# Patient Record
Sex: FEMALE | Race: WHITE | NOT HISPANIC OR LATINO | URBAN - METROPOLITAN AREA
[De-identification: names, ages, dates, MRNs, and addresses within clinical notes are randomized per-mention and may not be internally consistent; named-entity substitution may affect disease eponyms.]

---

## 2017-09-05 ENCOUNTER — OUTPATIENT (OUTPATIENT)
Dept: OUTPATIENT SERVICES | Facility: HOSPITAL | Age: 57
LOS: 1 days | Discharge: HOME | End: 2017-09-05
Payer: MEDICARE

## 2017-09-05 PROCEDURE — 45378 DIAGNOSTIC COLONOSCOPY: CPT

## 2017-09-07 DIAGNOSIS — K64.4 RESIDUAL HEMORRHOIDAL SKIN TAGS: ICD-10-CM

## 2017-09-07 DIAGNOSIS — Z12.11 ENCOUNTER FOR SCREENING FOR MALIGNANT NEOPLASM OF COLON: ICD-10-CM

## 2017-09-28 ENCOUNTER — INPATIENT (INPATIENT)
Facility: HOSPITAL | Age: 57
LOS: 4 days | Discharge: NURSING, REHAB OR LONG-TERM CARE | DRG: 897 | End: 2017-10-03
Attending: SPECIALIST | Admitting: SPECIALIST
Payer: MEDICARE

## 2017-09-28 VITALS
HEIGHT: 66 IN | OXYGEN SATURATION: 96 % | TEMPERATURE: 98 F | RESPIRATION RATE: 18 BRPM | HEART RATE: 87 BPM | SYSTOLIC BLOOD PRESSURE: 121 MMHG | DIASTOLIC BLOOD PRESSURE: 75 MMHG | WEIGHT: 166.67 LBS

## 2017-09-28 DIAGNOSIS — Z29.9 ENCOUNTER FOR PROPHYLACTIC MEASURES, UNSPECIFIED: ICD-10-CM

## 2017-09-28 DIAGNOSIS — I10 ESSENTIAL (PRIMARY) HYPERTENSION: ICD-10-CM

## 2017-09-28 DIAGNOSIS — R52 PAIN, UNSPECIFIED: ICD-10-CM

## 2017-09-28 DIAGNOSIS — Z98.890 OTHER SPECIFIED POSTPROCEDURAL STATES: Chronic | ICD-10-CM

## 2017-09-28 DIAGNOSIS — F41.9 ANXIETY DISORDER, UNSPECIFIED: ICD-10-CM

## 2017-09-28 DIAGNOSIS — R63.8 OTHER SYMPTOMS AND SIGNS CONCERNING FOOD AND FLUID INTAKE: ICD-10-CM

## 2017-09-28 LAB
ALBUMIN SERPL ELPH-MCNC: 3.7 G/DL — SIGNIFICANT CHANGE UP (ref 3.3–5)
ALP SERPL-CCNC: 89 U/L — SIGNIFICANT CHANGE UP (ref 40–120)
ALT FLD-CCNC: 13 U/L — SIGNIFICANT CHANGE UP (ref 10–45)
ANION GAP SERPL CALC-SCNC: 11 MMOL/L — SIGNIFICANT CHANGE UP (ref 5–17)
APPEARANCE UR: CLEAR — SIGNIFICANT CHANGE UP
APTT BLD: 31.8 SEC — SIGNIFICANT CHANGE UP (ref 27.5–37.4)
AST SERPL-CCNC: 16 U/L — SIGNIFICANT CHANGE UP (ref 10–40)
BASOPHILS NFR BLD AUTO: 0.4 % — SIGNIFICANT CHANGE UP (ref 0–2)
BILIRUB SERPL-MCNC: 0.2 MG/DL — SIGNIFICANT CHANGE UP (ref 0.2–1.2)
BILIRUB UR-MCNC: NEGATIVE — SIGNIFICANT CHANGE UP
BUN SERPL-MCNC: 19 MG/DL — SIGNIFICANT CHANGE UP (ref 7–23)
CALCIUM SERPL-MCNC: 9.1 MG/DL — SIGNIFICANT CHANGE UP (ref 8.4–10.5)
CHLORIDE SERPL-SCNC: 97 MMOL/L — SIGNIFICANT CHANGE UP (ref 96–108)
CO2 SERPL-SCNC: 30 MMOL/L — SIGNIFICANT CHANGE UP (ref 22–31)
COLOR SPEC: YELLOW — SIGNIFICANT CHANGE UP
CREAT SERPL-MCNC: 0.7 MG/DL — SIGNIFICANT CHANGE UP (ref 0.5–1.3)
DIFF PNL FLD: NEGATIVE — SIGNIFICANT CHANGE UP
EOSINOPHIL NFR BLD AUTO: 2.2 % — SIGNIFICANT CHANGE UP (ref 0–6)
GLUCOSE SERPL-MCNC: 73 MG/DL — SIGNIFICANT CHANGE UP (ref 70–99)
GLUCOSE UR QL: NEGATIVE — SIGNIFICANT CHANGE UP
HCT VFR BLD CALC: 37.9 % — SIGNIFICANT CHANGE UP (ref 34.5–45)
HGB BLD-MCNC: 12.3 G/DL — SIGNIFICANT CHANGE UP (ref 11.5–15.5)
INR BLD: 1.13 — SIGNIFICANT CHANGE UP (ref 0.88–1.16)
KETONES UR-MCNC: NEGATIVE — SIGNIFICANT CHANGE UP
LEUKOCYTE ESTERASE UR-ACNC: NEGATIVE — SIGNIFICANT CHANGE UP
LIDOCAIN IGE QN: 27 U/L — SIGNIFICANT CHANGE UP (ref 7–60)
LYMPHOCYTES # BLD AUTO: 26.2 % — SIGNIFICANT CHANGE UP (ref 13–44)
MAGNESIUM SERPL-MCNC: 2.1 MG/DL — SIGNIFICANT CHANGE UP (ref 1.6–2.6)
MCHC RBC-ENTMCNC: 26.1 PG — LOW (ref 27–34)
MCHC RBC-ENTMCNC: 32.5 G/DL — SIGNIFICANT CHANGE UP (ref 32–36)
MCV RBC AUTO: 80.3 FL — SIGNIFICANT CHANGE UP (ref 80–100)
MONOCYTES NFR BLD AUTO: 11.4 % — SIGNIFICANT CHANGE UP (ref 2–14)
NEUTROPHILS NFR BLD AUTO: 59.8 % — SIGNIFICANT CHANGE UP (ref 43–77)
NITRITE UR-MCNC: NEGATIVE — SIGNIFICANT CHANGE UP
PH UR: 7.5 — SIGNIFICANT CHANGE UP (ref 5–8)
PLATELET # BLD AUTO: 212 K/UL — SIGNIFICANT CHANGE UP (ref 150–400)
POTASSIUM SERPL-MCNC: 3 MMOL/L — LOW (ref 3.5–5.3)
POTASSIUM SERPL-SCNC: 3 MMOL/L — LOW (ref 3.5–5.3)
PROT SERPL-MCNC: 7.5 G/DL — SIGNIFICANT CHANGE UP (ref 6–8.3)
PROT UR-MCNC: NEGATIVE MG/DL — SIGNIFICANT CHANGE UP
PROTHROM AB SERPL-ACNC: 12.6 SEC — SIGNIFICANT CHANGE UP (ref 9.8–12.7)
RBC # BLD: 4.72 M/UL — SIGNIFICANT CHANGE UP (ref 3.8–5.2)
RBC # FLD: 12.2 % — SIGNIFICANT CHANGE UP (ref 10.3–16.9)
SODIUM SERPL-SCNC: 138 MMOL/L — SIGNIFICANT CHANGE UP (ref 135–145)
SP GR SPEC: 1.01 — SIGNIFICANT CHANGE UP (ref 1–1.03)
UROBILINOGEN FLD QL: 0.2 E.U./DL — SIGNIFICANT CHANGE UP
WBC # BLD: 5.4 K/UL — SIGNIFICANT CHANGE UP (ref 3.8–10.5)
WBC # FLD AUTO: 5.4 K/UL — SIGNIFICANT CHANGE UP (ref 3.8–10.5)

## 2017-09-28 PROCEDURE — 99285 EMERGENCY DEPT VISIT HI MDM: CPT

## 2017-09-28 RX ORDER — CLONAZEPAM 1 MG
0.25 TABLET ORAL DAILY
Qty: 0 | Refills: 0 | Status: DISCONTINUED | OUTPATIENT
Start: 2017-09-28 | End: 2017-10-03

## 2017-09-28 RX ORDER — HYDROCHLOROTHIAZIDE 25 MG
25 TABLET ORAL DAILY
Qty: 0 | Refills: 0 | Status: DISCONTINUED | OUTPATIENT
Start: 2017-09-29 | End: 2017-10-03

## 2017-09-28 RX ORDER — POTASSIUM CHLORIDE 20 MEQ
60 PACKET (EA) ORAL ONCE
Qty: 0 | Refills: 0 | Status: COMPLETED | OUTPATIENT
Start: 2017-09-28 | End: 2017-09-28

## 2017-09-28 RX ORDER — POTASSIUM CHLORIDE 20 MEQ
20 PACKET (EA) ORAL DAILY
Qty: 0 | Refills: 0 | Status: DISCONTINUED | OUTPATIENT
Start: 2017-09-28 | End: 2017-10-02

## 2017-09-28 RX ORDER — TIZANIDINE 4 MG/1
2 TABLET ORAL
Qty: 0 | Refills: 0 | COMMUNITY

## 2017-09-28 RX ORDER — HEPARIN SODIUM 5000 [USP'U]/ML
5000 INJECTION INTRAVENOUS; SUBCUTANEOUS EVERY 8 HOURS
Qty: 0 | Refills: 0 | Status: DISCONTINUED | OUTPATIENT
Start: 2017-09-28 | End: 2017-10-03

## 2017-09-28 RX ORDER — HYDROXYZINE HCL 10 MG
1 TABLET ORAL
Qty: 0 | Refills: 0 | COMMUNITY

## 2017-09-28 RX ORDER — VALSARTAN 80 MG/1
320 TABLET ORAL DAILY
Qty: 0 | Refills: 0 | Status: DISCONTINUED | OUTPATIENT
Start: 2017-09-29 | End: 2017-10-03

## 2017-09-28 RX ORDER — HYDROXYZINE HCL 10 MG
50 TABLET ORAL EVERY 8 HOURS
Qty: 0 | Refills: 0 | Status: DISCONTINUED | OUTPATIENT
Start: 2017-09-28 | End: 2017-10-03

## 2017-09-28 RX ORDER — CLONAZEPAM 1 MG
0.5 TABLET ORAL AT BEDTIME
Qty: 0 | Refills: 0 | Status: DISCONTINUED | OUTPATIENT
Start: 2017-09-28 | End: 2017-10-03

## 2017-09-28 RX ORDER — CLONAZEPAM 1 MG
0.25 TABLET ORAL
Qty: 0 | Refills: 0 | COMMUNITY

## 2017-09-28 RX ORDER — DEXTROAMPHETAMINE SACCHARATE, AMPHETAMINE ASPARTATE, DEXTROAMPHETAMINE SULFATE AND AMPHETAMINE SULFATE 1.875; 1.875; 1.875; 1.875 MG/1; MG/1; MG/1; MG/1
10 TABLET ORAL DAILY
Qty: 0 | Refills: 0 | Status: DISCONTINUED | OUTPATIENT
Start: 2017-09-29 | End: 2017-10-03

## 2017-09-28 RX ORDER — CLONAZEPAM 1 MG
0.5 TABLET ORAL
Qty: 0 | Refills: 0 | COMMUNITY

## 2017-09-28 RX ORDER — DEXTROAMPHETAMINE SACCHARATE, AMPHETAMINE ASPARTATE, DEXTROAMPHETAMINE SULFATE AND AMPHETAMINE SULFATE 1.875; 1.875; 1.875; 1.875 MG/1; MG/1; MG/1; MG/1
1 TABLET ORAL
Qty: 0 | Refills: 0 | COMMUNITY

## 2017-09-28 RX ORDER — SODIUM CHLORIDE 9 MG/ML
1000 INJECTION INTRAMUSCULAR; INTRAVENOUS; SUBCUTANEOUS ONCE
Qty: 0 | Refills: 0 | Status: COMPLETED | OUTPATIENT
Start: 2017-09-28 | End: 2017-09-28

## 2017-09-28 RX ORDER — DEXTROAMPHETAMINE SACCHARATE, AMPHETAMINE ASPARTATE, DEXTROAMPHETAMINE SULFATE AND AMPHETAMINE SULFATE 1.875; 1.875; 1.875; 1.875 MG/1; MG/1; MG/1; MG/1
20 TABLET ORAL DAILY
Qty: 0 | Refills: 0 | Status: DISCONTINUED | OUTPATIENT
Start: 2017-09-29 | End: 2017-10-03

## 2017-09-28 RX ORDER — MAGNESIUM OXIDE 400 MG ORAL TABLET 241.3 MG
400 TABLET ORAL
Qty: 0 | Refills: 0 | Status: DISCONTINUED | OUTPATIENT
Start: 2017-09-28 | End: 2017-10-03

## 2017-09-28 RX ADMIN — SODIUM CHLORIDE 1000 MILLILITER(S): 9 INJECTION INTRAMUSCULAR; INTRAVENOUS; SUBCUTANEOUS at 21:04

## 2017-09-28 RX ADMIN — Medication 60 MILLIEQUIVALENT(S): at 22:01

## 2017-09-28 NOTE — H&P ADULT - NSHPREVIEWOFSYSTEMS_GEN_ALL_CORE
REVIEW OF SYSTEMS:    CONSTITUTIONAL: Patient denies weakness, fevers or chills  EYES/ENT: Patient denies visual changes;  denies vertigo or throat pain   NECK: Chronic pain in back with relief now that pump is filled  RESPIRATORY: Patient denies cough, wheezing, hemoptysis; denies shortness of breath  CARDIOVASCULAR: Patient denies chest pain or palpitations  GASTROINTESTINAL: Patient denies abdominal or epigastric pain, nausea, vomiting, or hematemesis, diarrhea; melena or hematochezia.  GENITOURINARY: Patient denies dysuria, frequency or hematuria  NEUROLOGICAL: Patient denies numbness or weakness  SKIN: Patient denies itching, burning, rashes, or lesions   All other review of systems is negative unless indicated above.

## 2017-09-28 NOTE — H&P ADULT - PROBLEM SELECTOR PLAN 4
Patient does not know full list (torsemide dose not known), continuing all other medications of known doses  - Monitor for HTN or Hypotension

## 2017-09-28 NOTE — H&P ADULT - ASSESSMENT
Patient is a 56 yo F with PMHx of HTN, depression, anxiety, chronic pain with multiple past surgeries and intrathecal pump who presents from [facility] Pain management due to empty thecal pump for unknown number of days.

## 2017-09-28 NOTE — ED ADULT TRIAGE NOTE - CHIEF COMPLAINT QUOTE
"My doctor told me to come in. My morphine pump ran dry and my doctor wants to keep me for observation. "

## 2017-09-28 NOTE — ED PROVIDER NOTE - MEDICAL DECISION MAKING DETAILS
plan in place with Dr Silva and Erika and thus admission placed IVF -> bactroban to back indicated and may consider cellulitic antibiotic course versus elevation observation for lower extremity process

## 2017-09-28 NOTE — ED PROVIDER NOTE - PHYSICAL EXAMINATION
bilateral legs with both edema and rubor noted with chronic trophic skin changes + excoriations noted along back sans any overt superimposed infection

## 2017-09-28 NOTE — ED PROVIDER NOTE - ATTENDING CONTRIBUTION TO CARE
57 yof accompanied by pain management Dr. James for admission for pump malfunction.      agree w/ PA, avss, ambulatory w/ walker, will admit for pain control and further management w/ pain consult.

## 2017-09-28 NOTE — ED ADULT NURSE NOTE - OBJECTIVE STATEMENT
58 y/o female w/ hx of psych hx on risperidone, scoliosis under pain management Dr. James ([phone]) brought in for broken morphine PCA pump to be observed. As per Dr. James, patient has also been getting oxycodone without his knowledge from another provider. Patient reports that her pump has not been working for 2 weeks and has been taking the oxycodone instead. Dr. James concerned for overdosing of medication, wants patient admitted for observation. Patient has aide at bedside. Patient anxious, ambulatory, skin intact, A+Ox3.

## 2017-09-28 NOTE — H&P ADULT - ATTENDING COMMENTS
Admitted after missing her pain pump refill time.  Pump filled by pain and required admission for r/o withdrawl/sedation.  Chr pain--appears comfortable

## 2017-09-28 NOTE — H&P ADULT - PROBLEM SELECTOR PLAN 6
Heparin 5000 units SQ Q8hrs  No indication for stress ulcer ppx    FULL CODE    DIspo: Admite to [facility]

## 2017-09-28 NOTE — H&P ADULT - HISTORY OF PRESENT ILLNESS
Patient is a 58 yo F with PMHx of HTN, depression, anxiety, chronic pain with multiple past surgeries and intrathecal pump who presents from [facility] Pain management due to empty thecal pump for unknown number of days. Patient is a 58 yo F with PMHx of HTN, depression, anxiety, chronic pain with multiple past surgeries and intrathecal pump who presents from [facility] Pain management due to empty thecal pump for unknown number of days. Patient states that she has not had increased need but missed a refill by two weeks, and is not sure when she ran out. The patient has been having palpitations, sweating, and feeling 'not like herself' for the past four to seven days. She otherwise feels at baseline, feels a lot better now that her pump is refilled and has not needed any further increases in opioid medications. Patient denies fevers, chills, dizziness, changes in vision, fainting, chest pain, SOB, N/V/D. Last BM was two days ago, requesting dulcolax suppository for tonight and if it doesnt work then enema. Patient currently with HA on right side similar to past cluster headaches.

## 2017-09-28 NOTE — ED PROVIDER NOTE - OBJECTIVE STATEMENT
patient arrives with [facility] Pain management MD Dr James ( note in chart ) with planned admission to medical service for care and concerns about patients intrathecal pump use. Patient with chronic large dosing 12mg daily intrathecally + VICKY use with pump  that has now been empty for ? period of time. Concerns about either possible withdrawal now past or perhaps still to come + in Office Dr James today all day and no evidence withdrawal  and pump placed with small dosing 0.2md/day + Hx pump placed in [facility] and  multiple Spinal surgeries Dr Moreno though now at [place]. Case discussed with Dr Silva who accepts admission with DR James overseeing pain management pathology. Patient reports excoriations at back needing Bactroban. + Lives at facility and ambulates with walker

## 2017-09-28 NOTE — H&P ADULT - NSHPLABSRESULTS_GEN_ALL_CORE
.  LABS:                         12.3   5.4   )-----------( 212      ( 28 Sep 2017 20:53 )             37.9         138  |  97  |  19  ----------------------------<  73  3.0<L>   |  30  |  0.70    Ca    9.1      28 Sep 2017 20:53  Mg     2.1         TPro  7.5  /  Alb  3.7  /  TBili  0.2  /  DBili  x   /  AST  16  /  ALT  13  /  AlkPhos  89      PT/INR - ( 28 Sep 2017 20:53 )   PT: 12.6 sec;   INR: 1.13          PTT - ( 28 Sep 2017 20:53 )  PTT:31.8 sec  Urinalysis Basic - ( 28 Sep 2017 21:17 )    Color: Yellow / Appearance: Clear / S.010 / pH: x  Gluc: x / Ketone: NEGATIVE  / Bili: NEGATIVE / Urobili: 0.2 E.U./dL   Blood: x / Protein: NEGATIVE mg/dL / Nitrite: NEGATIVE   Leuk Esterase: NEGATIVE / RBC: x / WBC x   Sq Epi: x / Non Sq Epi: x / Bacteria: x                RADIOLOGY, EKG & ADDITIONAL TESTS: Reviewed.

## 2017-09-28 NOTE — H&P ADULT - NSHPPHYSICALEXAM_GEN_ALL_CORE
.  VITAL SIGNS:  T(F): 97.8 (09-28-17 @ 22:14), Max: 97.8 (09-28-17 @ 22:14)  HR: 90 (09-28-17 @ 22:14) (87 - 90)  BP: 104/67 (09-28-17 @ 22:14) (104/67 - 121/75)  BP(mean): --  RR: 18 (09-28-17 @ 22:14) (18 - 18)  SpO2: 99% (09-28-17 @ 22:14) (96% - 99%)    PHYSICAL EXAM:    Constitutional: WDWN resting comfortably in bed; NAD  HEENT: NC/AT, PERRL, EOMI, anicteric sclera, no nasal discharge; uvula midline, no oropharyngeal erythema or exudates; MMM  Neck: supple; no JVD or thyromegaly  Respiratory: CTA B/L; no W/R/R, no retractions  Cardiac: +S1/S2; RRR; no M/R/G; PMI non-displaced  Gastrointestinal: soft, NT/ND; no rebound or guarding; +BSx4  Back: painful, intrathecal pump in place without surrounding erythema  Extremities: WWP, no clubbing or cyanosis; 2+ peripheral edema to mid shin  Musculoskeletal: NROM x4; no joint swelling, tenderness or erythema  Vascular: 2+ radial, femoral, DP/PT pulses B/L  Dermatologic: LE rubor to shin  Lymphatic: no submandibular or cervical LAD  Neurologic: AAOx3; CNII-XII grossly intact; no focal deficits  Psychiatric: affect and characteristics of appearance, verbalizations, behaviors are appropriate, denies SI/HI/AH/VH

## 2017-09-28 NOTE — H&P ADULT - NSHPSOCIALHISTORY_GEN_ALL_CORE
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

## 2017-09-29 LAB
ANION GAP SERPL CALC-SCNC: 10 MMOL/L — SIGNIFICANT CHANGE UP (ref 5–17)
BUN SERPL-MCNC: 16 MG/DL — SIGNIFICANT CHANGE UP (ref 7–23)
CALCIUM SERPL-MCNC: 9.2 MG/DL — SIGNIFICANT CHANGE UP (ref 8.4–10.5)
CHLORIDE SERPL-SCNC: 102 MMOL/L — SIGNIFICANT CHANGE UP (ref 96–108)
CO2 SERPL-SCNC: 26 MMOL/L — SIGNIFICANT CHANGE UP (ref 22–31)
CREAT SERPL-MCNC: 0.65 MG/DL — SIGNIFICANT CHANGE UP (ref 0.5–1.3)
GLUCOSE SERPL-MCNC: 98 MG/DL — SIGNIFICANT CHANGE UP (ref 70–99)
HCT VFR BLD CALC: 39.8 % — SIGNIFICANT CHANGE UP (ref 34.5–45)
HGB BLD-MCNC: 13 G/DL — SIGNIFICANT CHANGE UP (ref 11.5–15.5)
MAGNESIUM SERPL-MCNC: 2.2 MG/DL — SIGNIFICANT CHANGE UP (ref 1.6–2.6)
MCHC RBC-ENTMCNC: 26.5 PG — LOW (ref 27–34)
MCHC RBC-ENTMCNC: 32.7 G/DL — SIGNIFICANT CHANGE UP (ref 32–36)
MCV RBC AUTO: 81.1 FL — SIGNIFICANT CHANGE UP (ref 80–100)
PLATELET # BLD AUTO: 220 K/UL — SIGNIFICANT CHANGE UP (ref 150–400)
POTASSIUM SERPL-MCNC: 4.1 MMOL/L — SIGNIFICANT CHANGE UP (ref 3.5–5.3)
POTASSIUM SERPL-SCNC: 4.1 MMOL/L — SIGNIFICANT CHANGE UP (ref 3.5–5.3)
RBC # BLD: 4.91 M/UL — SIGNIFICANT CHANGE UP (ref 3.8–5.2)
RBC # FLD: 12.7 % — SIGNIFICANT CHANGE UP (ref 10.3–16.9)
SODIUM SERPL-SCNC: 138 MMOL/L — SIGNIFICANT CHANGE UP (ref 135–145)
WBC # BLD: 5.4 K/UL — SIGNIFICANT CHANGE UP (ref 3.8–10.5)
WBC # FLD AUTO: 5.4 K/UL — SIGNIFICANT CHANGE UP (ref 3.8–10.5)

## 2017-09-29 RX ORDER — OXYCODONE HYDROCHLORIDE 5 MG/1
10 TABLET ORAL EVERY 6 HOURS
Qty: 0 | Refills: 0 | Status: DISCONTINUED | OUTPATIENT
Start: 2017-09-29 | End: 2017-10-01

## 2017-09-29 RX ORDER — INFLUENZA VIRUS VACCINE 15; 15; 15; 15 UG/.5ML; UG/.5ML; UG/.5ML; UG/.5ML
0.5 SUSPENSION INTRAMUSCULAR ONCE
Qty: 0 | Refills: 0 | Status: COMPLETED | OUTPATIENT
Start: 2017-09-29 | End: 2017-10-01

## 2017-09-29 RX ORDER — OXYCODONE HYDROCHLORIDE 5 MG/1
20 TABLET ORAL EVERY 12 HOURS
Qty: 0 | Refills: 0 | Status: DISCONTINUED | OUTPATIENT
Start: 2017-09-29 | End: 2017-09-29

## 2017-09-29 RX ORDER — OXYCODONE HYDROCHLORIDE 5 MG/1
20 TABLET ORAL DAILY
Qty: 0 | Refills: 0 | Status: DISCONTINUED | OUTPATIENT
Start: 2017-09-30 | End: 2017-10-03

## 2017-09-29 RX ADMIN — Medication 50 MILLIGRAM(S): at 17:44

## 2017-09-29 RX ADMIN — MAGNESIUM OXIDE 400 MG ORAL TABLET 400 MILLIGRAM(S): 241.3 TABLET ORAL at 13:40

## 2017-09-29 RX ADMIN — MAGNESIUM OXIDE 400 MG ORAL TABLET 400 MILLIGRAM(S): 241.3 TABLET ORAL at 09:26

## 2017-09-29 RX ADMIN — OXYCODONE HYDROCHLORIDE 10 MILLIGRAM(S): 5 TABLET ORAL at 18:33

## 2017-09-29 RX ADMIN — Medication 50 MILLIGRAM(S): at 06:44

## 2017-09-29 RX ADMIN — OXYCODONE HYDROCHLORIDE 10 MILLIGRAM(S): 5 TABLET ORAL at 19:17

## 2017-09-29 RX ADMIN — OXYCODONE HYDROCHLORIDE 20 MILLIGRAM(S): 5 TABLET ORAL at 11:30

## 2017-09-29 RX ADMIN — VALSARTAN 320 MILLIGRAM(S): 80 TABLET ORAL at 09:26

## 2017-09-29 RX ADMIN — DEXTROAMPHETAMINE SACCHARATE, AMPHETAMINE ASPARTATE, DEXTROAMPHETAMINE SULFATE AND AMPHETAMINE SULFATE 10 MILLIGRAM(S): 1.875; 1.875; 1.875; 1.875 TABLET ORAL at 17:41

## 2017-09-29 RX ADMIN — MAGNESIUM OXIDE 400 MG ORAL TABLET 400 MILLIGRAM(S): 241.3 TABLET ORAL at 18:28

## 2017-09-29 RX ADMIN — Medication 20 MILLIEQUIVALENT(S): at 13:40

## 2017-09-29 RX ADMIN — Medication 20 MILLIGRAM(S): at 17:43

## 2017-09-29 RX ADMIN — HEPARIN SODIUM 5000 UNIT(S): 5000 INJECTION INTRAVENOUS; SUBCUTANEOUS at 17:42

## 2017-09-29 RX ADMIN — DEXTROAMPHETAMINE SACCHARATE, AMPHETAMINE ASPARTATE, DEXTROAMPHETAMINE SULFATE AND AMPHETAMINE SULFATE 10 MILLIGRAM(S): 1.875; 1.875; 1.875; 1.875 TABLET ORAL at 13:41

## 2017-09-29 RX ADMIN — HEPARIN SODIUM 5000 UNIT(S): 5000 INJECTION INTRAVENOUS; SUBCUTANEOUS at 06:43

## 2017-09-29 RX ADMIN — Medication 0.5 MILLIGRAM(S): at 00:08

## 2017-09-29 RX ADMIN — OXYCODONE HYDROCHLORIDE 20 MILLIGRAM(S): 5 TABLET ORAL at 10:29

## 2017-09-29 RX ADMIN — Medication 25 MILLIGRAM(S): at 06:44

## 2017-09-29 RX ADMIN — Medication 0.25 MILLIGRAM(S): at 13:40

## 2017-09-29 NOTE — PROGRESS NOTE ADULT - ASSESSMENT
Patient is a 56 yo F with PMHx of HTN, depression, anxiety, chronic pain with multiple past surgeries and intrathecal pump who presents from [facility] Pain management due to empty thecal pump for unknown number of days. Patient is a 56 yo F with PMHx of HTN, depression, anxiety, chronic pain with multiple past surgeries and intrathecal pump who presents from [facility] Pain management due to empty thecal pump for unknown number of days, monitoring for opioid withdrawal and optimization of current pain management regimen.

## 2017-09-29 NOTE — PHYSICAL THERAPY INITIAL EVALUATION ADULT - ADDITIONAL COMMENTS
Patient lives in nursing home, states she was receiving rehab however that had ended. Patient states she was able to walk short distances with RW and endorses ~4 falls in the past 3 months.

## 2017-09-29 NOTE — PROGRESS NOTE ADULT - PROBLEM SELECTOR PLAN 4
Patient does not know full list (torsemide dose not known), continuing all other medications of known doses  - Monitor for HTN or Hypotension Patient does not know full list (torsemide dose not known), continuing all other medications of known doses  - Will continue to monitor for HTN or Hypotension and any other signs of opioid withdrawal. If patient becomes HD unstable, consider ICU consult for possible step-up.

## 2017-09-29 NOTE — PROGRESS NOTE ADULT - PROBLEM SELECTOR PLAN 2
As per outpatient physician, oxycodone 10mg PO as breakthrough if needed As per outpatient physician, oxycodone 10mg PO as breakthrough if needed, however hold if patient appears overly sedated.

## 2017-09-29 NOTE — PROGRESS NOTE ADULT - PROBLEM SELECTOR PLAN 1
Patient without opioids for unknown number of days, now with morphine in thecal pump  - monitor for withdrawal  - pain management as per specialist, f/u recs in AM  - tizanidine not available, f.u with pain management in AM for alternative, currently without spasm complaints Patient without opioids for unknown number of days, now with morphine in thecal pump.   - Continue to monitor for signs or symptoms of withdrawal (i.e. diaphoresis, nausea/vomiting, diarrhea, yawning)  - Per patient's private pain management doctor (Dr James), patient on Oxycontin 20 mg q12h, Oxycodone IR 10 mg q6h PRN and intrathecal pump. Patient overly sedated this morning, will adjust Oxycontin and decreased frequency from q12h to daily (Oxycontin 20 mg daily starting tomorrow).  - tizanidine not available, f.u with pain management in AM for alternative, currently without spasm complaints

## 2017-09-29 NOTE — PHYSICAL THERAPY INITIAL EVALUATION ADULT - PERTINENT HX OF CURRENT PROBLEM, REHAB EVAL
Patient is a 57 year old female who presents from [facility] Pain management due to empty thecal pump for unknown number of days.

## 2017-09-29 NOTE — CONSULT NOTE ADULT - SUBJECTIVE AND OBJECTIVE BOX
Patient is a 57y old  Female who presents with a chief complaint of Possible Opiate withdrawal (28 Sep 2017 22:25)        HPI:  Patient is a 58 yo F with PMHx of HTN, depression, anxiety, chronic pain with multiple past surgeries and intrathecal pump who presents from [facility] Pain management due to empty thecal pump for unknown number of days. Patient states that she has not had increased need but missed a refill by two weeks, and is not sure when she ran out. The patient has been having palpitations, sweating, and feeling 'not like herself' for the past four to seven days. She otherwise feels at baseline, feels a lot better now that her pump is refilled and has not needed any further increases in opioid medications. Patient denies fevers, chills, dizziness, changes in vision, fainting, chest pain, SOB, N/V/D. Last BM was two days ago, requesting dulcolax suppository for tonight and if it doesnt work then enema. Patient currently with HA on right side similar to past cluster headaches. (28 Sep 2017 22:25)     Chronic back pain with pain in the LE- s/p pump      Allergies  Depakote (Anaphylaxis)  Haldol (Anaphylaxis)  Invega (Anaphylaxis)  Paxil (Anaphylaxis)  Steroids=Psychosis (Other)  Thorazine (Anaphylaxis)      Health Issues  PAIN MANAGEMENT  No h/o HF  Unknown h/o HF  No pertinent family history in first degree relatives  MEWS Score  Chronic pain  HTN (hypertension)  Pain management  Need for prophylactic measure  Nutrition, metabolism, and development symptoms  HTN (hypertension)  Anxiety  Pain management  H/O Spinal surgery  MED EVAL  Opioid withdrawal        FAMILY HISTORY:  No pertinent family history in first degree relatives      MEDICATIONS  (STANDING):  clonazePAM Tablet 0.25 milliGRAM(s) Oral daily  clonazePAM Tablet 0.5 milliGRAM(s) Oral at bedtime  valsartan 320 milliGRAM(s) Oral daily  hydrochlorothiazide 25 milliGRAM(s) Oral daily  hydrOXYzine hydrochloride 50 milliGRAM(s) Oral every 8 hours  amphetamine/dextroamphetamine 10 milliGRAM(s) Oral daily  amphetamine/dextroamphetamine 20 milliGRAM(s) Oral daily  potassium chloride    Tablet ER 20 milliEquivalent(s) Oral daily  magnesium oxide 400 milliGRAM(s) Oral three times a day with meals  bisacodyl Suppository 20 milliGRAM(s) Rectal daily  heparin  Injectable 5000 Unit(s) SubCutaneous every 8 hours  influenza   Vaccine 0.5 milliLiter(s) IntraMuscular once    MEDICATIONS  (PRN):  clonazePAM Tablet 0.25 milliGRAM(s) Oral daily PRN Anxiety      PAST MEDICAL & SURGICAL HISTORY:  Chronic pain  HTN (hypertension)  H/O Spinal surgery      Labs                          12.3   5.4   )-----------( 212      ( 28 Sep 2017 20:53 )             37.9     09-28    138  |  97  |  19  ----------------------------<  73  3.0<L>   |  30  |  0.70    Ca    9.1      28 Sep 2017 20:53  Mg     2.1     09-28    TPro  7.5  /  Alb  3.7  /  TBili  0.2  /  DBili  x   /  AST  16  /  ALT  13  /  AlkPhos  89  09-28      Radiology:    Physical Exam    MENTAL STATUS  -Level of Consciousness- awake    Orientation- person, place time  Language- aphasia/ dysarthria  Memory- recent and remote      Cranial Nerve 1- 12  Pupils- equal and reactive  Eye movements-full  Facial - no asymmetry   Lower CN-nl    Gait and Station- unsteady    MOTOR  Upper-moves all 4 extremities  Lower- decreased    Reflexes- decreased    Sensation- no sensory level    Cerebellar- no tremors     vascular -    Assessment- Lumbar radiculopathy    Plan Chronic Pain   No acute neuro deficits   As per Dr Chaudhary

## 2017-09-29 NOTE — PROGRESS NOTE ADULT - SUBJECTIVE AND OBJECTIVE BOX
OVERNIGHT EVENTS: No acute overnight events.     SUBJECTIVE / INTERVAL HPI: Patient seen and examined at bedside. Unable to obtain ROS during encounter, patient lethargic and incoherent.    VITAL SIGNS:  Vital Signs Last 24 Hrs  T(C): 36.8 (29 Sep 2017 09:17), Max: 36.8 (29 Sep 2017 09:17)  T(F): 98.2 (29 Sep 2017 09:17), Max: 98.2 (29 Sep 2017 09:17)  HR: 80 (29 Sep 2017 09:) (80 - 90)  BP: 130/85 (29 Sep 2017 09:) (94/56 - 130/85)  BP(mean): --  RR: 19 (29 Sep 2017 09:) (18 - 19)  SpO2: 95% (29 Sep 2017 09:) (95% - 99%)    PHYSICAL EXAM:    General: WDWN, lying in bed, arousable, at times alert (with sternal rub), incoherent  HEENT: NC/AT; PERRL, anicteric sclera; MMM, unable to visualize posterior oropharynx  Neck: supple  Lymph: no cervical or supraclavicular LAD  Cardiovascular: +S1/S2; RRR, no m/r/g appreciated on auscultation  Respiratory: CTA B/L; no W/R/R  Gastrointestinal: soft, NT; distended abdomen, +BSx4  : suprapubic fullness  Extremities: WWP; no clubbing or cyanosis, lower extremity peripheral edema   Vascular: 2+ radial, DP/PT pulses B/L  Neurological: Arousable, alert at times, follows commands selectively    MEDICATIONS:  MEDICATIONS  (STANDING):  clonazePAM Tablet 0.25 milliGRAM(s) Oral daily  clonazePAM Tablet 0.5 milliGRAM(s) Oral at bedtime  valsartan 320 milliGRAM(s) Oral daily  hydrochlorothiazide 25 milliGRAM(s) Oral daily  hydrOXYzine hydrochloride 50 milliGRAM(s) Oral every 8 hours  amphetamine/dextroamphetamine 10 milliGRAM(s) Oral daily  amphetamine/dextroamphetamine 20 milliGRAM(s) Oral daily  potassium chloride    Tablet ER 20 milliEquivalent(s) Oral daily  magnesium oxide 400 milliGRAM(s) Oral three times a day with meals  bisacodyl Suppository 20 milliGRAM(s) Rectal daily  heparin  Injectable 5000 Unit(s) SubCutaneous every 8 hours  influenza   Vaccine 0.5 milliLiter(s) IntraMuscular once  oxyCODONE  ER Tablet 20 milliGRAM(s) Oral every 12 hours    MEDICATIONS  (PRN):  clonazePAM Tablet 0.25 milliGRAM(s) Oral daily PRN Anxiety  oxyCODONE    IR 10 milliGRAM(s) Oral every 6 hours PRN Severe Pain (7 - 10)      ALLERGIES:  Allergies    Depakote (Anaphylaxis)  Haldol (Anaphylaxis)  Invega (Anaphylaxis)  Paxil (Anaphylaxis)  Steroids=Psychosis (Other)  Thorazine (Anaphylaxis)    Intolerances        LABS:                        13.0   5.4   )-----------( 220      ( 29 Sep 2017 10:35 )             39.8         138  |  102  |  16  ----------------------------<  98  4.1   |  26  |  0.65    Ca    9.2      29 Sep 2017 10:35  Mg     2.2         TPro  7.5  /  Alb  3.7  /  TBili  0.2  /  DBili  x   /  AST  16  /  ALT  13  /  AlkPhos  89      PT/INR - ( 28 Sep 2017 20:53 )   PT: 12.6 sec;   INR: 1.13          PTT - ( 28 Sep 2017 20:53 )  PTT:31.8 sec  Urinalysis Basic - ( 28 Sep 2017 21:17 )    Color: Yellow / Appearance: Clear / S.010 / pH: x  Gluc: x / Ketone: NEGATIVE  / Bili: NEGATIVE / Urobili: 0.2 E.U./dL   Blood: x / Protein: NEGATIVE mg/dL / Nitrite: NEGATIVE   Leuk Esterase: NEGATIVE / RBC: x / WBC x   Sq Epi: x / Non Sq Epi: x / Bacteria: x      CAPILLARY BLOOD GLUCOSE          RADIOLOGY & ADDITIONAL TESTS: Reviewed.    ASSESSMENT:    PLAN:

## 2017-09-30 DIAGNOSIS — F90.9 ATTENTION-DEFICIT HYPERACTIVITY DISORDER, UNSPECIFIED TYPE: ICD-10-CM

## 2017-09-30 LAB
ANION GAP SERPL CALC-SCNC: 15 MMOL/L — SIGNIFICANT CHANGE UP (ref 5–17)
BUN SERPL-MCNC: 12 MG/DL — SIGNIFICANT CHANGE UP (ref 7–23)
CALCIUM SERPL-MCNC: 9.1 MG/DL — SIGNIFICANT CHANGE UP (ref 8.4–10.5)
CHLORIDE SERPL-SCNC: 103 MMOL/L — SIGNIFICANT CHANGE UP (ref 96–108)
CO2 SERPL-SCNC: 23 MMOL/L — SIGNIFICANT CHANGE UP (ref 22–31)
CREAT SERPL-MCNC: 0.64 MG/DL — SIGNIFICANT CHANGE UP (ref 0.5–1.3)
GLUCOSE SERPL-MCNC: 120 MG/DL — HIGH (ref 70–99)
HCT VFR BLD CALC: 38 % — SIGNIFICANT CHANGE UP (ref 34.5–45)
HGB BLD-MCNC: 12.5 G/DL — SIGNIFICANT CHANGE UP (ref 11.5–15.5)
MAGNESIUM SERPL-MCNC: 2.1 MG/DL — SIGNIFICANT CHANGE UP (ref 1.6–2.6)
MCHC RBC-ENTMCNC: 26.1 PG — LOW (ref 27–34)
MCHC RBC-ENTMCNC: 32.9 G/DL — SIGNIFICANT CHANGE UP (ref 32–36)
MCV RBC AUTO: 79.3 FL — LOW (ref 80–100)
PLATELET # BLD AUTO: 223 K/UL — SIGNIFICANT CHANGE UP (ref 150–400)
POTASSIUM SERPL-MCNC: 3.3 MMOL/L — LOW (ref 3.5–5.3)
POTASSIUM SERPL-SCNC: 3.3 MMOL/L — LOW (ref 3.5–5.3)
RBC # BLD: 4.79 M/UL — SIGNIFICANT CHANGE UP (ref 3.8–5.2)
RBC # FLD: 12.4 % — SIGNIFICANT CHANGE UP (ref 10.3–16.9)
SODIUM SERPL-SCNC: 141 MMOL/L — SIGNIFICANT CHANGE UP (ref 135–145)
WBC # BLD: 7.3 K/UL — SIGNIFICANT CHANGE UP (ref 3.8–10.5)
WBC # FLD AUTO: 7.3 K/UL — SIGNIFICANT CHANGE UP (ref 3.8–10.5)

## 2017-09-30 PROCEDURE — 99223 1ST HOSP IP/OBS HIGH 75: CPT | Mod: GC

## 2017-09-30 RX ORDER — POTASSIUM CHLORIDE 20 MEQ
40 PACKET (EA) ORAL EVERY 4 HOURS
Qty: 0 | Refills: 0 | Status: COMPLETED | OUTPATIENT
Start: 2017-09-30 | End: 2017-09-30

## 2017-09-30 RX ORDER — POLYETHYLENE GLYCOL 3350 17 G/17G
17 POWDER, FOR SOLUTION ORAL DAILY
Qty: 0 | Refills: 0 | Status: DISCONTINUED | OUTPATIENT
Start: 2017-09-30 | End: 2017-10-03

## 2017-09-30 RX ADMIN — Medication 40 MILLIEQUIVALENT(S): at 10:56

## 2017-09-30 RX ADMIN — POLYETHYLENE GLYCOL 3350 17 GRAM(S): 17 POWDER, FOR SOLUTION ORAL at 15:00

## 2017-09-30 RX ADMIN — MAGNESIUM OXIDE 400 MG ORAL TABLET 400 MILLIGRAM(S): 241.3 TABLET ORAL at 12:33

## 2017-09-30 RX ADMIN — OXYCODONE HYDROCHLORIDE 10 MILLIGRAM(S): 5 TABLET ORAL at 01:44

## 2017-09-30 RX ADMIN — Medication 0.5 MILLIGRAM(S): at 21:56

## 2017-09-30 RX ADMIN — MAGNESIUM OXIDE 400 MG ORAL TABLET 400 MILLIGRAM(S): 241.3 TABLET ORAL at 16:16

## 2017-09-30 RX ADMIN — Medication 50 MILLIGRAM(S): at 00:45

## 2017-09-30 RX ADMIN — OXYCODONE HYDROCHLORIDE 10 MILLIGRAM(S): 5 TABLET ORAL at 00:44

## 2017-09-30 RX ADMIN — OXYCODONE HYDROCHLORIDE 10 MILLIGRAM(S): 5 TABLET ORAL at 06:59

## 2017-09-30 RX ADMIN — HEPARIN SODIUM 5000 UNIT(S): 5000 INJECTION INTRAVENOUS; SUBCUTANEOUS at 00:45

## 2017-09-30 RX ADMIN — OXYCODONE HYDROCHLORIDE 20 MILLIGRAM(S): 5 TABLET ORAL at 12:32

## 2017-09-30 RX ADMIN — DEXTROAMPHETAMINE SACCHARATE, AMPHETAMINE ASPARTATE, DEXTROAMPHETAMINE SULFATE AND AMPHETAMINE SULFATE 10 MILLIGRAM(S): 1.875; 1.875; 1.875; 1.875 TABLET ORAL at 13:23

## 2017-09-30 RX ADMIN — MAGNESIUM OXIDE 400 MG ORAL TABLET 400 MILLIGRAM(S): 241.3 TABLET ORAL at 09:07

## 2017-09-30 RX ADMIN — OXYCODONE HYDROCHLORIDE 10 MILLIGRAM(S): 5 TABLET ORAL at 15:00

## 2017-09-30 RX ADMIN — OXYCODONE HYDROCHLORIDE 10 MILLIGRAM(S): 5 TABLET ORAL at 07:44

## 2017-09-30 RX ADMIN — Medication 40 MILLIEQUIVALENT(S): at 13:23

## 2017-09-30 RX ADMIN — HEPARIN SODIUM 5000 UNIT(S): 5000 INJECTION INTRAVENOUS; SUBCUTANEOUS at 05:57

## 2017-09-30 RX ADMIN — DEXTROAMPHETAMINE SACCHARATE, AMPHETAMINE ASPARTATE, DEXTROAMPHETAMINE SULFATE AND AMPHETAMINE SULFATE 20 MILLIGRAM(S): 1.875; 1.875; 1.875; 1.875 TABLET ORAL at 10:56

## 2017-09-30 RX ADMIN — Medication 25 MILLIGRAM(S): at 05:58

## 2017-09-30 RX ADMIN — HEPARIN SODIUM 5000 UNIT(S): 5000 INJECTION INTRAVENOUS; SUBCUTANEOUS at 21:56

## 2017-09-30 RX ADMIN — OXYCODONE HYDROCHLORIDE 20 MILLIGRAM(S): 5 TABLET ORAL at 13:11

## 2017-09-30 RX ADMIN — Medication 0.25 MILLIGRAM(S): at 12:31

## 2017-09-30 RX ADMIN — OXYCODONE HYDROCHLORIDE 10 MILLIGRAM(S): 5 TABLET ORAL at 22:01

## 2017-09-30 RX ADMIN — Medication 20 MILLIEQUIVALENT(S): at 12:32

## 2017-09-30 RX ADMIN — HEPARIN SODIUM 5000 UNIT(S): 5000 INJECTION INTRAVENOUS; SUBCUTANEOUS at 13:23

## 2017-09-30 RX ADMIN — VALSARTAN 320 MILLIGRAM(S): 80 TABLET ORAL at 09:07

## 2017-09-30 RX ADMIN — OXYCODONE HYDROCHLORIDE 10 MILLIGRAM(S): 5 TABLET ORAL at 16:00

## 2017-09-30 RX ADMIN — Medication 0.25 MILLIGRAM(S): at 05:58

## 2017-09-30 RX ADMIN — Medication 0.5 MILLIGRAM(S): at 00:44

## 2017-09-30 RX ADMIN — OXYCODONE HYDROCHLORIDE 10 MILLIGRAM(S): 5 TABLET ORAL at 23:01

## 2017-09-30 RX ADMIN — Medication 50 MILLIGRAM(S): at 16:15

## 2017-09-30 RX ADMIN — Medication 20 MILLIGRAM(S): at 12:33

## 2017-09-30 NOTE — PROGRESS NOTE ADULT - SUBJECTIVE AND OBJECTIVE BOX
TRANSFER NOTE:    Hospital Course: 58 yo F with PMHx of HTN, depression, anxiety, chronic pain with multiple past surgeries and intrathecal pump who presents from [facility] Pain management due to empty thecal pump for unknown number of days. Patient states that she has not had increased need but missed a refill by two weeks, and is not sure when she ran out. The patient has been having palpitations, sweating, and feeling 'not like herself' for the past four to seven days. Pt was admitted to [facility] for concern of opioid withdrawal. On [facility], VS stable, no signs or symptoms of withdrawal. Pt was placed on Oxycontin 20mg daily and Oxycodone 10mg IR for breakthrough pain. Pt had urinary retention on [facility], PVR>600cc, and required Grimm catheter placement. Pt is still experiencing pain, Dr. James (pt's pain specialist) is requesting telemonitoring to restart pt's intrathecal pump to monitor for neuro and respiratory status when restarting the pump. Pt accepted to [facility].    INTERVAL HPI/OVERNIGHT EVENTS: No overnight events      SUBJECTIVE: Patient seen and examined at bedside.     ROS:  CV: Denies chest pain  Resp: Denies SOB  GI: Denies abdominal pain, constipation, diarrhea, nausea, vomiting  : Denies dysuria  ID: Denies fevers, chills  MSK: Denies joint pain     OBJECTIVE:    VITAL SIGNS:  ICU Vital Signs Last 24 Hrs  T(C): 36.7 (30 Sep 2017 09:10), Max: 36.8 (29 Sep 2017 16:05)  T(F): 98.1 (30 Sep 2017 09:10), Max: 98.2 (29 Sep 2017 16:05)  HR: 81 (30 Sep 2017 09:10) (72 - 81)  BP: 125/80 (30 Sep 2017 09:10) (121/79 - 129/80)  BP(mean): --  ABP: --  ABP(mean): --  RR: 18 (30 Sep 2017 09:10) (18 - 19)  SpO2: 99% (30 Sep 2017 09:10) (99% - 100%)         @ 07:01  -   @ 07:00  --------------------------------------------------------  IN: 0 mL / OUT: 1741 mL / NET: -1741 mL      CAPILLARY BLOOD GLUCOSE          PHYSICAL EXAM:    General: NAD, appears drowsy but easily arousable  HEENT: NCAT, PERRL, clear conjunctiva, no scleral icterus  Neck: supple, no JVD  Respiratory: CTA b/l, no wheezing, rhonchi, rales  Cardiovascular: RRR, normal S1S2, no M/R/G  Abdomen: soft, NT/ND, bowel sounds in all four quadrants, no palpable masses  Extremities: WWP, no clubbing, cyanosis, or edema  Neuro: drowsy but arousable to voice, AAOx3, follows commands    MEDICATIONS:  MEDICATIONS  (STANDING):  clonazePAM Tablet 0.25 milliGRAM(s) Oral daily  clonazePAM Tablet 0.5 milliGRAM(s) Oral at bedtime  valsartan 320 milliGRAM(s) Oral daily  hydrochlorothiazide 25 milliGRAM(s) Oral daily  hydrOXYzine hydrochloride 50 milliGRAM(s) Oral every 8 hours  amphetamine/dextroamphetamine 10 milliGRAM(s) Oral daily  amphetamine/dextroamphetamine 20 milliGRAM(s) Oral daily  potassium chloride    Tablet ER 20 milliEquivalent(s) Oral daily  magnesium oxide 400 milliGRAM(s) Oral three times a day with meals  bisacodyl Suppository 20 milliGRAM(s) Rectal daily  heparin  Injectable 5000 Unit(s) SubCutaneous every 8 hours  influenza   Vaccine 0.5 milliLiter(s) IntraMuscular once  oxyCODONE  ER Tablet 20 milliGRAM(s) Oral daily    MEDICATIONS  (PRN):  clonazePAM Tablet 0.25 milliGRAM(s) Oral daily PRN Anxiety  oxyCODONE    IR 10 milliGRAM(s) Oral every 6 hours PRN Severe Pain (7 - 10)      ALLERGIES:  Allergies    Depakote (Anaphylaxis)  Haldol (Anaphylaxis)  Invega (Anaphylaxis)  Paxil (Anaphylaxis)  Steroids=Psychosis (Other)  Thorazine (Anaphylaxis)    Intolerances        LABS:                        12.5   7.3   )-----------( 223      ( 30 Sep 2017 08:47 )             38.0     09-30    141  |  103  |  12  ----------------------------<  120<H>  3.3<L>   |  23  |  0.64    Ca    9.1      30 Sep 2017 08:47  Mg     2.1         TPro  7.5  /  Alb  3.7  /  TBili  0.2  /  DBili  x   /  AST  16  /  ALT  13  /  AlkPhos  89      PT/INR - ( 28 Sep 2017 20:53 )   PT: 12.6 sec;   INR: 1.13          PTT - ( 28 Sep 2017 20:53 )  PTT:31.8 sec  Urinalysis Basic - ( 28 Sep 2017 21:17 )    Color: Yellow / Appearance: Clear / S.010 / pH: x  Gluc: x / Ketone: NEGATIVE  / Bili: NEGATIVE / Urobili: 0.2 E.U./dL   Blood: x / Protein: NEGATIVE mg/dL / Nitrite: NEGATIVE   Leuk Esterase: NEGATIVE / RBC: x / WBC x   Sq Epi: x / Non Sq Epi: x / Bacteria: x        RADIOLOGY & ADDITIONAL TESTS: Reviewed.

## 2017-09-30 NOTE — PROGRESS NOTE ADULT - PROBLEM SELECTOR PLAN 7
F: No IVF warranted at this time  E: Replete electrolytes as needed, K>4, Mg>2  N: DASH/TLC    DVT ppx: Heparin 5000 units SQ Q8hrs  No indication for stress ulcer ppx  FULL CODE  DIspo: [id number]

## 2017-09-30 NOTE — CONSULT NOTE ADULT - ATTENDING COMMENTS
Patient seen and examined with house-staff during bedside rounds.  Resident note read, including vitals, physical findings, laboratory data, and radiological reports.   Revisions included below.  Direct personal management at bed side and extensive interpretation of the data.  Plan was outlined and discussed in details with the housestaff.  Decision making of high complexity  the case was discussed with pain management. Patient is to be monitored during administration of epidural analgesic. Monitor the resp. status.

## 2017-09-30 NOTE — PROGRESS NOTE ADULT - SUBJECTIVE AND OBJECTIVE BOX
PGY 1 [facility] TRANSFER ACCEPTANCE NOTE     HOSPITAL COURSE  58 yo F with PMHx of HTN, depression, anxiety, chronic pain with multiple past surgeries and intrathecal pump who presents from [facility] due to empty thecal pump for unknown number of days. Patient states that she has not had increased need but missed a refill by two weeks, and is not sure when she ran out. The patient has been having palpitations, sweating, and feeling 'not like herself' for the past four to seven days along with increasing back pain that has been worsening . Pt was admitted to [facility] for concern of opioid withdrawal. On [facility], VS stable, no signs or symptoms of withdrawal. Pt was placed on Oxycontin 20mg daily and Oxycodone 10mg IR for breakthrough pain. Pt had urinary retention on [facility], PVR>600cc, and required Grimm catheter placement. Pt is still experiencing pain, Dr. James (pt's pain specialist) is requesting telemonitoring to restart pt's intrathecal pump to monitor for neuro and respiratory status when restarting the pump. Pt accepted to [facility].    INTERVAL HPI/OVERNIGHT EVENTS:  Patient was seen and examined at bedside. As per nurse and patient, no o/n events, patient resting comfortably. No complaints at this time. Patient denies: fever, chills, dizziness, weakness, HA, Changes in vision, CP, palpitations, SOB, cough, N/V/D/C, dysuria, changes in bowel movements, LE edema.    VITAL SIGNS:  T(F): 98.2 (17 @ 15:45)  HR: 90 (17 @ 15:45)  BP: 126/83 (17 @ 15:45)  RR: 17 (17 @ 15:45)  SpO2: 100% (17 @ 15:45)  Wt(kg): --    PHYSICAL EXAM:    Constitutional: WDWN, NAD  Eyes: PERRL, EOMI, sclera non-icteric  Neck: supple, trachea midline, no masses, no JVD  Respiratory: CTA b/l, good air entry b/l, no wheezing, no rhonchi, no rales, without accessory muscle use and no intercostal retractions  Cardiovascular: RRR, normal S1S2, no M/R/G  Gastrointestinal: soft, NTND, no masses palpable, BS normal  Extremities: Warm, well perfused, pulses equal bilateral upper and lower extremities, no edema, no clubbing  Neurological: AAOx3, CN Grossly intact  Skin: Normal temperature, warm, dry    MEDICATIONS  (STANDING):  clonazePAM Tablet 0.25 milliGRAM(s) Oral daily  clonazePAM Tablet 0.5 milliGRAM(s) Oral at bedtime  valsartan 320 milliGRAM(s) Oral daily  hydrochlorothiazide 25 milliGRAM(s) Oral daily  hydrOXYzine hydrochloride 50 milliGRAM(s) Oral every 8 hours  amphetamine/dextroamphetamine 10 milliGRAM(s) Oral daily  amphetamine/dextroamphetamine 20 milliGRAM(s) Oral daily  potassium chloride    Tablet ER 20 milliEquivalent(s) Oral daily  magnesium oxide 400 milliGRAM(s) Oral three times a day with meals  bisacodyl Suppository 20 milliGRAM(s) Rectal daily  heparin  Injectable 5000 Unit(s) SubCutaneous every 8 hours  influenza   Vaccine 0.5 milliLiter(s) IntraMuscular once  oxyCODONE  ER Tablet 20 milliGRAM(s) Oral daily  polyethylene glycol 3350 17 Gram(s) Oral daily    MEDICATIONS  (PRN):  clonazePAM Tablet 0.25 milliGRAM(s) Oral daily PRN Anxiety  oxyCODONE    IR 10 milliGRAM(s) Oral every 6 hours PRN Severe Pain (7 - 10)      Allergies    Depakote (Anaphylaxis)  Haldol (Anaphylaxis)  Invega (Anaphylaxis)  Paxil (Anaphylaxis)  Steroids=Psychosis (Other)  Thorazine (Anaphylaxis)    Intolerances        LABS:                        12.5   7.3   )-----------( 223      ( 30 Sep 2017 08:47 )             38.0         141  |  103  |  12  ----------------------------<  120<H>  3.3<L>   |  23  |  0.64    Ca    9.1      30 Sep 2017 08:47  Mg     2.1         TPro  7.5  /  Alb  3.7  /  TBili  0.2  /  DBili  x   /  AST  16  /  ALT  13  /  AlkPhos  89      PT/INR - ( 28 Sep 2017 20:53 )   PT: 12.6 sec;   INR: 1.13          PTT - ( 28 Sep 2017 20:53 )  PTT:31.8 sec  Urinalysis Basic - ( 28 Sep 2017 21:17 )    Color: Yellow / Appearance: Clear / S.010 / pH: x  Gluc: x / Ketone: NEGATIVE  / Bili: NEGATIVE / Urobili: 0.2 E.U./dL   Blood: x / Protein: NEGATIVE mg/dL / Nitrite: NEGATIVE   Leuk Esterase: NEGATIVE / RBC: x / WBC x   Sq Epi: x / Non Sq Epi: x / Bacteria: x        RADIOLOGY & ADDITIONAL TESTS: PGY 1 [facility] TRANSFER ACCEPTANCE NOTE     HOSPITAL COURSE  56 yo F with PMHx of HTN, depression, anxiety, chronic pain with multiple past surgeries and intrathecal pump who presents from [facility] management due to empty thecal pump for unknown number of days. Patient states that she has not had increased need but missed a refill by two weeks, and is not sure when she ran out. The patient has been having palpitations, sweating, and feeling 'not like herself' for the past four to seven days along with increasing back pain that has been worsening . Pt was admitted to [facility] for concern of opioid withdrawal. On [facility], VS stable, no signs or symptoms of withdrawal. Pt was placed on Oxycontin 20mg daily and Oxycodone 10mg IR for breakthrough pain. Pt had urinary retention on [facility], PVR>600cc, and required [facility]imm catheter placement. Pt is still experiencing pain, Dr. James (pt's pain specialist) is requesting telemonitoring to restart pt's intrathecal pump to monitor for neuro and respiratory status when restarting the pump. Pt accepted to [facility].    INTERVAL HPI/OVERNIGHT EVENTS:  Patient was seen and examined at bedside. As per nurse and patient, no o/n events, patient resting comfortably. No complaints at this time. Patient denies: fever, chills, dizziness, weakness, HA, Changes in vision, CP, palpitations, SOB, cough, N/V/D/C, dysuria, changes in bowel movements, LE edema.    VITAL SIGNS:  T(F): 98.2 (17 @ 15:45)  HR: 90 (17 @ 15:45)  BP: 126/83 (17 @ 15:45)  RR: 17 (17 @ 15:45)  SpO2: 100% (17 @ 15:45)  Wt(kg): --    PHYSICAL EXAM:    General: NAD, AOOx3  HEENT: NCAT, PERRL, clear conjunctiva, no scleral icterus  Neck: supple, no JVD  Respiratory: CTA b/l, no wheezing, rhonchi, rales  Cardiovascular: RRR, normal S1S2, no M/R/G  Abdomen: soft, NT/ND, bowel sounds normoactive throughout, RLQ subcutaneous pump. No signs of infection  Extremities: WWP, no clubbing, cyanosis, or edema  Neuro -  - Mental Status:  Alert, awake, oriented to person, place, and time; Speech is fluent. Flat affect  Skin: 2 lesions upper and lower(erythematous)    MEDICATIONS  (STANDING):  clonazePAM Tablet 0.25 milliGRAM(s) Oral daily  clonazePAM Tablet 0.5 milliGRAM(s) Oral at bedtime  valsartan 320 milliGRAM(s) Oral daily  hydrochlorothiazide 25 milliGRAM(s) Oral daily  hydrOXYzine hydrochloride 50 milliGRAM(s) Oral every 8 hours  amphetamine/dextroamphetamine 10 milliGRAM(s) Oral daily  amphetamine/dextroamphetamine 20 milliGRAM(s) Oral daily  potassium chloride    Tablet ER 20 milliEquivalent(s) Oral daily  magnesium oxide 400 milliGRAM(s) Oral three times a day with meals  bisacodyl Suppository 20 milliGRAM(s) Rectal daily  heparin  Injectable 5000 Unit(s) SubCutaneous every 8 hours  influenza   Vaccine 0.5 milliLiter(s) IntraMuscular once  oxyCODONE  ER Tablet 20 milliGRAM(s) Oral daily  polyethylene glycol 3350 17 Gram(s) Oral daily    MEDICATIONS  (PRN):  clonazePAM Tablet 0.25 milliGRAM(s) Oral daily PRN Anxiety  oxyCODONE    IR 10 milliGRAM(s) Oral every 6 hours PRN Severe Pain (7 - 10)      Allergies    Depakote (Anaphylaxis)  Haldol (Anaphylaxis)  Invega (Anaphylaxis)  Paxil (Anaphylaxis)  Steroids=Psychosis (Other)  Thorazine (Anaphylaxis)    Intolerances        LABS:                        12.5   7.3   )-----------( 223      ( 30 Sep 2017 08:47 )             38.0         141  |  103  |  12  ----------------------------<  120<H>  3.3<L>   |  23  |  0.64    Ca    9.1      30 Sep 2017 08:47  Mg     2.1         TPro  7.5  /  Alb  3.7  /  TBili  0.2  /  DBili  x   /  AST  16  /  ALT  13  /  AlkPhos  89      PT/INR - ( 28 Sep 2017 20:53 )   PT: 12.6 sec;   INR: 1.13          PTT - ( 28 Sep 2017 20:53 )  PTT:31.8 sec  Urinalysis Basic - ( 28 Sep 2017 21:17 )    Color: Yellow / Appearance: Clear / S.010 / pH: x  Gluc: x / Ketone: NEGATIVE  / Bili: NEGATIVE / Urobili: 0.2 E.U./dL   Blood: x / Protein: NEGATIVE mg/dL / Nitrite: NEGATIVE   Leuk Esterase: NEGATIVE / RBC: x / WBC x   Sq Epi: x / Non Sq Epi: x / Bacteria: x        RADIOLOGY & ADDITIONAL TESTS:

## 2017-09-30 NOTE — CONSULT NOTE ADULT - SUBJECTIVE AND OBJECTIVE BOX
ICU CONSULT    Patient is a 56 yo F with PMHx of HTN, depression, anxiety, chronic pain with multiple past surgeries and intrathecal pump who presents from [facility] Pain management due to empty thecal pump for unknown number of days. Patient states that she has not had increased need but missed a refill by two weeks, and is not sure when she ran out. Pt is a poor historian. She mentioned that her back pain is still there and getting worse. It's allover her back mainly lower radiating to the lower extremities. She wanted nasal saline for congestion. She has been having constipation with urinary retention that required repeated straight cath (now she had Grimm's). Pt moved her bowel yesterday. She has 2 back itching lesions (erythematous), as per her, the place that she stays at, has bedbugs. She denied any fever,chills, SOB,chest pain,vomiting but feels nauseaous.        PMHx:  Chronic pain  HTN (hypertension)  H/O Spinal surgery      Home medications:    Current Medications:  MEDICATIONS  (STANDING):  clonazePAM Tablet 0.25 milliGRAM(s) Oral daily  clonazePAM Tablet 0.5 milliGRAM(s) Oral at bedtime  valsartan 320 milliGRAM(s) Oral daily  hydrochlorothiazide 25 milliGRAM(s) Oral daily  hydrOXYzine hydrochloride 50 milliGRAM(s) Oral every 8 hours  amphetamine/dextroamphetamine 10 milliGRAM(s) Oral daily  amphetamine/dextroamphetamine 20 milliGRAM(s) Oral daily  potassium chloride    Tablet ER 20 milliEquivalent(s) Oral daily  magnesium oxide 400 milliGRAM(s) Oral three times a day with meals  bisacodyl Suppository 20 milliGRAM(s) Rectal daily  heparin  Injectable 5000 Unit(s) SubCutaneous every 8 hours  influenza   Vaccine 0.5 milliLiter(s) IntraMuscular once  oxyCODONE  ER Tablet 20 milliGRAM(s) Oral daily    MEDICATIONS  (PRN):  clonazePAM Tablet 0.25 milliGRAM(s) Oral daily PRN Anxiety  oxyCODONE    IR 10 milliGRAM(s) Oral every 6 hours PRN Severe Pain (7 - 10)      Allergies:    Depakote (Anaphylaxis)  Haldol (Anaphylaxis)  Invega (Anaphylaxis)  Paxil (Anaphylaxis)  Steroids=Psychosis (Other)  Thorazine (Anaphylaxis)      Social history:  Tobacco:Denied  EtOH  Drugs: History of IVDU with heroin,   Social life: lives in nursing home 2/2 disabilities unable to walk without walker    Family history:  No pertinent family history in first degree relatives      Review of system:  General: No malaise, fever, chills.  Eyes: No eye pain, visual disturbances, or discharge  ENMT:  No difficulty hearing, tinnitus, vertigo; No sinus or throat pain  Neck: No pain or stiffness  Respiratory: No cough, wheezing, chills or hemoptysis  Cardiovascular: No chest pain, palpitations, shortness of breath, dizziness or leg swelling  Gastrointestinal: No abdominal or epigastric pain. no vomiting or hematemesis;  no diarrhea . No melena or hematochezia.  Genitourinary: No dysuria, frequency, hematuria     VITAL SIGNS:  ICU Vital Signs Last 24 Hrs  T(C): 36.7 (30 Sep 2017 09:10), Max: 36.8 (29 Sep 2017 16:05)  T(F): 98.1 (30 Sep 2017 09:10), Max: 98.2 (29 Sep 2017 16:05)  HR: 81 (30 Sep 2017 09:10) (72 - 81)  BP: 125/80 (30 Sep 2017 09:10) (121/79 - 129/80)  BP(mean): --  ABP: --  ABP(mean): --  RR: 18 (30 Sep 2017 09:10) (18 - 19)  SpO2: 99% (30 Sep 2017 09:10) (99% - 100%)      17 @ 07:01  -  17 @ 07:00  --------------------------------------------------------  IN: 0 mL / OUT: 1741 mL / NET: -1741 mL      CAPILLARY BLOOD GLUCOSE          PHYSICAL EXAM   General: NAD, in pain, AOx3  HEENT: NCAT, PERRL, clear conjunctiva, no scleral icterus  Neck: supple, no JVD  Respiratory: CTA b/l, no wheezing, rhonchi, rales  Cardiovascular: tachycardia, normal S1S2, no M/R/G  Abdomen: soft, NT/ND, bowel sounds normoactive throughout, RLQ subcutaneous pump. No signs of infection  Extremities: WWP, no clubbing, cyanosis, or edema  Neuro -  - Mental Status:  Alert, awake, oriented to person, place, and time; Speech is fluent   Skin: 2 lesions upper and lower(erythematous)    LABS                        12.5   7.3   )-----------( 223      ( 30 Sep 2017 08:47 )             38.0         141  |  103  |  12  ----------------------------<  120<H>  3.3<L>   |  23  |  0.64    Ca    9.1      30 Sep 2017 08:47  Mg     2.1         TPro  7.5  /  Alb  3.7  /  TBili  0.2  /  DBili  x   /  AST  16  /  ALT  13  /  AlkPhos  89      PT/INR - ( 28 Sep 2017 20:53 )   PT: 12.6 sec;   INR: 1.13          PTT - ( 28 Sep 2017 20:53 )  PTT:31.8 sec    Urinalysis Basic - ( 28 Sep 2017 21:17 )    Color: Yellow / Appearance: Clear / S.010 / pH: x  Gluc: x / Ketone: NEGATIVE  / Bili: NEGATIVE / Urobili: 0.2 E.U./dL   Blood: x / Protein: NEGATIVE mg/dL / Nitrite: NEGATIVE   Leuk Esterase: NEGATIVE / RBC: x / WBC x   Sq Epi: x / Non Sq Epi: x / Bacteria: x        IMAGING  Reviewed ICU CONSULT    Patient is a 56 yo F with PMHx of HTN, depression, anxiety, chronic pain with multiple past surgeries and intrathecal pump who presents from [facility] Pain management due to empty thecal pump for unknown number of days. Patient states that she has not had increased need but missed a refill by two weeks, and is not sure when she ran out. Pt is a poor historian. She mentioned that her back pain is still there and getting worse. It's allover her back mainly lower radiating to the lower extremities. She wanted nasal saline for congestion. She has been having constipation with urinary retention that required repeated straight cath (now she had Grimm's). Pt moved her bowel yesterday. She has 2 back itching lesions (erythematous), as per her, the place that she stays at, has bedbugs. She denied any fever,chills, SOB,chest pain,vomiting but feels nauseaous.        PMHx:  Chronic pain  HTN (hypertension)  H/O Spinal surgery      Home medications:    Current Medications:  MEDICATIONS  (STANDING):  clonazePAM Tablet 0.25 milliGRAM(s) Oral daily  clonazePAM Tablet 0.5 milliGRAM(s) Oral at bedtime  valsartan 320 milliGRAM(s) Oral daily  hydrochlorothiazide 25 milliGRAM(s) Oral daily  hydrOXYzine hydrochloride 50 milliGRAM(s) Oral every 8 hours  amphetamine/dextroamphetamine 10 milliGRAM(s) Oral daily  amphetamine/dextroamphetamine 20 milliGRAM(s) Oral daily  potassium chloride    Tablet ER 20 milliEquivalent(s) Oral daily  magnesium oxide 400 milliGRAM(s) Oral three times a day with meals  bisacodyl Suppository 20 milliGRAM(s) Rectal daily  heparin  Injectable 5000 Unit(s) SubCutaneous every 8 hours  influenza   Vaccine 0.5 milliLiter(s) IntraMuscular once  oxyCODONE  ER Tablet 20 milliGRAM(s) Oral daily    MEDICATIONS  (PRN):  clonazePAM Tablet 0.25 milliGRAM(s) Oral daily PRN Anxiety  oxyCODONE    IR 10 milliGRAM(s) Oral every 6 hours PRN Severe Pain (7 - 10)      Allergies:    Depakote (Anaphylaxis)  Haldol (Anaphylaxis)  Invega (Anaphylaxis)  Paxil (Anaphylaxis)  Steroids=Psychosis (Other)  Thorazine (Anaphylaxis)      Social history:  Tobacco:Denied  EtOH  Drugs: History of IVDU with heroin,   Social life: lives in nursing home 2/2 disabilities unable to walk without walker    Family history:  No pertinent family history in first degree relatives      Review of system:  General: No malaise, fever, chills.  Eyes: No eye pain, visual disturbances, or discharge  ENMT:  No difficulty hearing, tinnitus, vertigo; No sinus or throat pain  Neck: No pain or stiffness  Respiratory: No cough, wheezing, chills or hemoptysis  Cardiovascular: No chest pain, palpitations, shortness of breath, dizziness or leg swelling  Gastrointestinal: No abdominal or epigastric pain. no vomiting or hematemesis;  no diarrhea . No melena or hematochezia.  Genitourinary: No dysuria, frequency, hematuria     VITAL SIGNS:  ICU Vital Signs Last 24 Hrs  T(C): 36.7 (30 Sep 2017 09:10), Max: 36.8 (29 Sep 2017 16:05)  T(F): 98.1 (30 Sep 2017 09:10), Max: 98.2 (29 Sep 2017 16:05)  HR: 81 (30 Sep 2017 09:10) (72 - 81)  BP: 125/80 (30 Sep 2017 09:10) (121/79 - 129/80)  BP(mean): --  ABP: --  ABP(mean): --  RR: 18 (30 Sep 2017 09:10) (18 - 19)  SpO2: 99% (30 Sep 2017 09:10) (99% - 100%)      17 @ 07:01  -  17 @ 07:00  --------------------------------------------------------  IN: 0 mL / OUT: 1741 mL / NET: -1741 mL      CAPILLARY BLOOD GLUCOSE          PHYSICAL EXAM   General: Mild distress (back pain), AOx3  HEENT: NCAT, PERRL, clear conjunctiva, no scleral icterus  Neck: supple, no JVD  Respiratory: CTA b/l, no wheezing, rhonchi, rales  Cardiovascular: tachycardia, normal S1S2, no M/R/G  Abdomen: soft, NT/ND, bowel sounds normoactive throughout, RLQ subcutaneous pump. No signs of infection  Extremities: WWP, no clubbing, cyanosis, or edema  Neuro -  - Mental Status:  Alert, awake, oriented to person, place, and time; Speech is fluent. Flat affect  Skin: 2 lesions upper and lower(erythematous)    LABS                        12.5   7.3   )-----------( 223      ( 30 Sep 2017 08:47 )             38.0         141  |  103  |  12  ----------------------------<  120<H>  3.3<L>   |  23  |  0.64    Ca    9.1      30 Sep 2017 08:47  Mg     2.1         TPro  7.5  /  Alb  3.7  /  TBili  0.2  /  DBili  x   /  AST  16  /  ALT  13  /  AlkPhos  89      PT/INR - ( 28 Sep 2017 20:53 )   PT: 12.6 sec;   INR: 1.13          PTT - ( 28 Sep 2017 20:53 )  PTT:31.8 sec    Urinalysis Basic - ( 28 Sep 2017 21:17 )    Color: Yellow / Appearance: Clear / S.010 / pH: x  Gluc: x / Ketone: NEGATIVE  / Bili: NEGATIVE / Urobili: 0.2 E.U./dL   Blood: x / Protein: NEGATIVE mg/dL / Nitrite: NEGATIVE   Leuk Esterase: NEGATIVE / RBC: x / WBC x   Sq Epi: x / Non Sq Epi: x / Bacteria: x        IMAGING  Reviewed ICU CONSULT    Patient is a 58 yo F with PMHx of HTN, depression, anxiety, chronic pain with multiple past surgeries and intrathecal pump who presents from [facility] Pain management due to empty thecal pump for unknown number of days. Patient states that she has not had increased need but missed a refill by two weeks, and is not sure when she ran out. Pt is a poor historian. She mentioned that her back pain is still there and getting worse since admission. It's allover her back constant 7/10 mainly lower radiating to the lower extremities mildly controlled with the pain medication and associated with tachycardia. She has been having constipation with urinary retention that required repeated straight cath (now she had Grimm's). Pt moved her bowel yesterday. She has 2 back itching lesions (erythematous), as per her, the place that she stays at, has bedbugs. She denied any fever,chills, SOB,chest pain,vomiting but feels nauseaous with watery eyes.     PMHx:  Chronic pain  HTN (hypertension)  H/O Spinal surgery      Home medications:    Current Medications:  MEDICATIONS  (STANDING):  clonazePAM Tablet 0.25 milliGRAM(s) Oral daily  clonazePAM Tablet 0.5 milliGRAM(s) Oral at bedtime  valsartan 320 milliGRAM(s) Oral daily  hydrochlorothiazide 25 milliGRAM(s) Oral daily  hydrOXYzine hydrochloride 50 milliGRAM(s) Oral every 8 hours  amphetamine/dextroamphetamine 10 milliGRAM(s) Oral daily  amphetamine/dextroamphetamine 20 milliGRAM(s) Oral daily  potassium chloride    Tablet ER 20 milliEquivalent(s) Oral daily  magnesium oxide 400 milliGRAM(s) Oral three times a day with meals  bisacodyl Suppository 20 milliGRAM(s) Rectal daily  heparin  Injectable 5000 Unit(s) SubCutaneous every 8 hours  influenza   Vaccine 0.5 milliLiter(s) IntraMuscular once  oxyCODONE  ER Tablet 20 milliGRAM(s) Oral daily    MEDICATIONS  (PRN):  clonazePAM Tablet 0.25 milliGRAM(s) Oral daily PRN Anxiety  oxyCODONE    IR 10 milliGRAM(s) Oral every 6 hours PRN Severe Pain (7 - 10)      Allergies:    Depakote (Anaphylaxis)  Haldol (Anaphylaxis)  Invega (Anaphylaxis)  Paxil (Anaphylaxis)  Steroids=Psychosis (Other)  Thorazine (Anaphylaxis)      Social history:  Tobacco:Quit one year ago (used to smoke for >20 yrs)  EtOH: Occasional (last drink >1 yr ago)  Drugs: History of IVDU with heroin,   Social life: lives in nursing home 2/2 disabilities unable to walk without walker    Family history:  No pertinent family history in first degree relatives      Review of system:  General: No malaise, fever, chills.  Eyes: No eye pain, visual disturbances, or discharge  ENMT:  No difficulty hearing, tinnitus, vertigo; No sinus or throat pain  Neck: No pain or stiffness  Respiratory: No cough, wheezing, chills or hemoptysis  Cardiovascular: No chest pain, shortness of breath, dizziness or leg swelling  Gastrointestinal: No abdominal or epigastric pain. no vomiting or hematemesis;  no diarrhea . No melena or hematochezia.  Genitourinary: No dysuria, frequency, hematuria     VITAL SIGNS:  ICU Vital Signs Last 24 Hrs  T(C): 36.7 (30 Sep 2017 09:10), Max: 36.8 (29 Sep 2017 16:05)  T(F): 98.1 (30 Sep 2017 09:10), Max: 98.2 (29 Sep 2017 16:05)  HR: 81 (30 Sep 2017 09:10) (72 - 81)  BP: 125/80 (30 Sep 2017 09:10) (121/79 - 129/80)  BP(mean): --  ABP: --  ABP(mean): --  RR: 18 (30 Sep 2017 09:10) (18 - 19)  SpO2: 99% (30 Sep 2017 09:10) (99% - 100%)      17 @ 07:01  -  17 @ 07:00  --------------------------------------------------------  IN: 0 mL / OUT: 1741 mL / NET: -1741 mL      CAPILLARY BLOOD GLUCOSE          PHYSICAL EXAM   General: Mild distress (pain), AOx3  HEENT: NCAT, PERRL, clear conjunctiva, no scleral icterus  Neck: supple, no JVD  Respiratory: CTA b/l, no wheezing, rhonchi, rales  Cardiovascular: tachycardia, normal S1S2, no M/R/G  Abdomen: soft, NT/ND, bowel sounds normoactive throughout, RLQ subcutaneous pump. No signs of infection  Extremities: WWP, no clubbing, cyanosis, or edema  Neuro -  - Mental Status:  Alert, awake, oriented to person, place, and time; Speech is fluent. Flat affect  Skin: 2 lesions upper and lower(erythematous)    LABS                        12.5   7.3   )-----------( 223      ( 30 Sep 2017 08:47 )             38.0         141  |  103  |  12  ----------------------------<  120<H>  3.3<L>   |  23  |  0.64    Ca    9.1      30 Sep 2017 08:47  Mg     2.1         TPro  7.5  /  Alb  3.7  /  TBili  0.2  /  DBili  x   /  AST  16  /  ALT  13  /  AlkPhos  89      PT/INR - ( 28 Sep 2017 20:53 )   PT: 12.6 sec;   INR: 1.13          PTT - ( 28 Sep 2017 20:53 )  PTT:31.8 sec    Urinalysis Basic - ( 28 Sep 2017 21:17 )    Color: Yellow / Appearance: Clear / S.010 / pH: x  Gluc: x / Ketone: NEGATIVE  / Bili: NEGATIVE / Urobili: 0.2 E.U./dL   Blood: x / Protein: NEGATIVE mg/dL / Nitrite: NEGATIVE   Leuk Esterase: NEGATIVE / RBC: x / WBC x   Sq Epi: x / Non Sq Epi: x / Bacteria: x        IMAGING  Reviewed ICU CONSULT    Patient is a 58 yo F with PMHx of HTN, depression, anxiety, chronic pain with multiple past surgeries and intrathecal pump who presents from [facility] Pain management due to empty thecal pump for unknown number of days. Patient states that she has not had increased need but missed a refill by two weeks, and is not sure when she ran out. Pt is a poor historian. She mentioned that her back pain is still there and getting worse since admission. It's allover her back constant 7/10 mainly lower radiating to the lower extremities mildly controlled with the pain medication and associated with tachycardia. She has been having constipation with urinary retention that required repeated straight cath (now she had Grimm's). Pt moved her bowel yesterday. She has 2 back itching lesions (erythematous), as per her, the place that she stays at, has bedbugs. She denied any fever,chills, SOB,chest pain,vomiting but feels nauseaous with watery eyes.     PMHx:  Chronic pain  HTN (hypertension)  H/O Spinal surgery      Home medications:    Current Medications:  MEDICATIONS  (STANDING):  clonazePAM Tablet 0.25 milliGRAM(s) Oral daily  clonazePAM Tablet 0.5 milliGRAM(s) Oral at bedtime  valsartan 320 milliGRAM(s) Oral daily  hydrochlorothiazide 25 milliGRAM(s) Oral daily  hydrOXYzine hydrochloride 50 milliGRAM(s) Oral every 8 hours  amphetamine/dextroamphetamine 10 milliGRAM(s) Oral daily  amphetamine/dextroamphetamine 20 milliGRAM(s) Oral daily  potassium chloride    Tablet ER 20 milliEquivalent(s) Oral daily  magnesium oxide 400 milliGRAM(s) Oral three times a day with meals  bisacodyl Suppository 20 milliGRAM(s) Rectal daily  heparin  Injectable 5000 Unit(s) SubCutaneous every 8 hours  influenza   Vaccine 0.5 milliLiter(s) IntraMuscular once  oxyCODONE  ER Tablet 20 milliGRAM(s) Oral daily    MEDICATIONS  (PRN):  clonazePAM Tablet 0.25 milliGRAM(s) Oral daily PRN Anxiety  oxyCODONE    IR 10 milliGRAM(s) Oral every 6 hours PRN Severe Pain (7 - 10)      Allergies:    Depakote (Anaphylaxis)  Haldol (Anaphylaxis)  Invega (Anaphylaxis)  Paxil (Anaphylaxis)  Steroids=Psychosis (Other)  Thorazine (Anaphylaxis)      Social history:  Tobacco:Quit one year ago (used to smoke for >20 yrs)  EtOH: Occasional (last drink >1 yr ago)  Drugs: History of IVDU with heroin,   Social life: lives in nursing home 2/2 disabilities unable to walk without walker    Family history:  No pertinent family history in first degree relatives      Review of system:  General: No malaise, fever, chills.  Eyes: No eye pain, visual disturbances, or discharge  ENMT:  No difficulty hearing, tinnitus, vertigo; No sinus or throat pain  Neck: No pain or stiffness  Respiratory: No cough, wheezing, chills or hemoptysis  Cardiovascular: No chest pain, shortness of breath, dizziness or leg swelling  Gastrointestinal: No abdominal or epigastric pain. no vomiting or hematemesis;  no diarrhea . No melena or hematochezia.  Genitourinary: No dysuria, frequency, hematuria     VITAL SIGNS:  ICU Vital Signs Last 24 Hrs  T(C): 36.7 (30 Sep 2017 09:10), Max: 36.8 (29 Sep 2017 16:05)  T(F): 98.1 (30 Sep 2017 09:10), Max: 98.2 (29 Sep 2017 16:05)  HR: 81 (30 Sep 2017 09:10) (72 - 81)  BP: 125/80 (30 Sep 2017 09:10) (121/79 - 129/80)  BP(mean): --  ABP: --  ABP(mean): --  RR: 18 (30 Sep 2017 09:10) (18 - 19)  SpO2: 99% (30 Sep 2017 09:10) (99% - 100%)      17 @ 07:01  -  17 @ 07:00  --------------------------------------------------------  IN: 0 mL / OUT: 1741 mL / NET: -1741 mL      CAPILLARY BLOOD GLUCOSE          PHYSICAL EXAM   General: Mild distress (pain), AOx3  HEENT: NCAT, PERRL, clear conjunctiva, no scleral icterus  Neck: supple, no JVD  Respiratory: CTA b/l, no wheezing, rhonchi, rales  Cardiovascular: tachycardia, normal S1S2, no M/R/G  Abdomen: soft, NT/ND, bowel sounds normoactive throughout, RLQ subcutaneous pump. No signs of infection  Extremities: WWP, no clubbing, cyanosis, or edema  Neuro -  - Mental Status:  Alert, awake, oriented to person, place, and time; Speech is fluent. Flat affect  Skin: 2 lesions upper and lower(erythematous)    LABS                        12.5   7.3   )-----------( 223      ( 30 Sep 2017 08:47 )             38.0         141  |  103  |  12  ----------------------------<  120<H>  3.3<L>   |  23  |  0.64    Ca    9.1      30 Sep 2017 08:47  Mg     2.1         TPro  7.5  /  Alb  3.7  /  TBili  0.2  /  DBili  x   /  AST  16  /  ALT  13  /  AlkPhos  89      PT/INR - ( 28 Sep 2017 20:53 )   PT: 12.6 sec;   INR: 1.13          PTT - ( 28 Sep 2017 20:53 )  PTT:31.8 sec    Urinalysis Basic - ( 28 Sep 2017 21:17 )    Color: Yellow / Appearance: Clear / S.010 / pH: x  Gluc: x / Ketone: NEGATIVE  / Bili: NEGATIVE / Urobili: 0.2 E.U./dL   Blood: x / Protein: NEGATIVE mg/dL / Nitrite: NEGATIVE   Leuk Esterase: NEGATIVE / RBC: x / WBC x   Sq Epi: x / Non Sq Epi: x / Bacteria: x ICU CONSULT    Patient is a 56 yo F with PMHx of HTN, depression, anxiety, chronic pain with multiple past surgeries and intrathecal pump who presents from [facility] Pain management due to empty thecal pump for unknown number of days. Patient states that she has not had increased need but missed a refill by two weeks, and is not sure when she ran out. Pt is a poor historian. She mentioned that her back pain is still there and getting worse since admission. It's allover her back constant 7/10 mainly lower radiating to the lower extremities mildly controlled with the pain medication and associated with tachycardia. She has been having constipation with urinary retention that required repeated straight cath (now she had Grimm's). Pt moved her bowel yesterday. She has 2 back itching lesions (erythematous), as per her, the place that she stays at, has bedbugs. She denied any fever,chills, SOB,chest pain,vomiting but feels nauseaous with watery eyes.     PMHx:  Chronic pain  HTN (hypertension)  H/O Spinal surgery      Home medications:    Current Medications:  MEDICATIONS  (STANDING):  clonazePAM Tablet 0.25 milliGRAM(s) Oral daily  clonazePAM Tablet 0.5 milliGRAM(s) Oral at bedtime  valsartan 320 milliGRAM(s) Oral daily  hydrochlorothiazide 25 milliGRAM(s) Oral daily  hydrOXYzine hydrochloride 50 milliGRAM(s) Oral every 8 hours  amphetamine/dextroamphetamine 10 milliGRAM(s) Oral daily  amphetamine/dextroamphetamine 20 milliGRAM(s) Oral daily  potassium chloride    Tablet ER 20 milliEquivalent(s) Oral daily  magnesium oxide 400 milliGRAM(s) Oral three times a day with meals  bisacodyl Suppository 20 milliGRAM(s) Rectal daily  heparin  Injectable 5000 Unit(s) SubCutaneous every 8 hours  influenza   Vaccine 0.5 milliLiter(s) IntraMuscular once  oxyCODONE  ER Tablet 20 milliGRAM(s) Oral daily    MEDICATIONS  (PRN):  clonazePAM Tablet 0.25 milliGRAM(s) Oral daily PRN Anxiety  oxyCODONE    IR 10 milliGRAM(s) Oral every 6 hours PRN Severe Pain (7 - 10)      Allergies:    Depakote (Anaphylaxis)  Haldol (Anaphylaxis)  Invega (Anaphylaxis)  Paxil (Anaphylaxis)  Steroids=Psychosis (Other)  Thorazine (Anaphylaxis)      Social history:  Tobacco:Quit one year ago (used to smoke for >20 yrs)  EtOH: Occasional (last drink >1 yr ago)  Drugs: History of IVDU with heroin,   Social life: lives in nursing home 2/2 disabilities unable to walk without walker    Family history:  No pertinent family history in first degree relatives      Review of system:  General: No malaise, fever, chills.  Eyes: No eye pain, visual disturbances, or discharge  ENMT:  No difficulty hearing, tinnitus, vertigo; No sinus or throat pain  Neck: No pain or stiffness  Respiratory: No cough, wheezing, chills or hemoptysis  Cardiovascular: No chest pain, shortness of breath, dizziness or leg swelling  Gastrointestinal: No abdominal or epigastric pain. no vomiting or hematemesis;  no diarrhea . No melena or hematochezia.  Genitourinary: No dysuria, frequency, hematuria     VITAL SIGNS:  ICU Vital Signs Last 24 Hrs  T(C): 36.7 (30 Sep 2017 09:10), Max: 36.8 (29 Sep 2017 16:05)  T(F): 98.1 (30 Sep 2017 09:10), Max: 98.2 (29 Sep 2017 16:05)  HR: 81 (30 Sep 2017 09:10) (72 - 81)  BP: 125/80 (30 Sep 2017 09:10) (121/79 - 129/80)  BP(mean): --  ABP: --  ABP(mean): --  RR: 18 (30 Sep 2017 09:10) (18 - 19)  SpO2: 99% (30 Sep 2017 09:10) (99% - 100%)      17 @ 07:01  -  17 @ 07:00  --------------------------------------------------------  IN: 0 mL / OUT: 1741 mL / NET: -1741 mL      CAPILLARY BLOOD GLUCOSE          PHYSICAL EXAM   General: Mild distress (pain), AOx3  HEENT: NCAT, PERRL, clear conjunctiva, no scleral icterus  Neck: supple, no JVD  Respiratory: CTA b/l, no wheezing, rhonchi, rales  Cardiovascular: mild tachycardia, normal S1S2, no M/R/G  Abdomen: soft, NT/ND, bowel sounds normoactive throughout, No signs of infection  Extremities: WWP, no clubbing, cyanosis, or edema  Neuro -  - Mental Status:  Alert, awake, oriented to person, place, and time; Speech is fluent. Flat affect  Skin: 2 lesions upper and lower back(erythematous),itching    LABS                        12.5   7.3   )-----------( 223      ( 30 Sep 2017 08:47 )             38.0         141  |  103  |  12  ----------------------------<  120<H>  3.3<L>   |  23  |  0.64    Ca    9.1      30 Sep 2017 08:47  Mg     2.1         TPro  7.5  /  Alb  3.7  /  TBili  0.2  /  DBili  x   /  AST  16  /  ALT  13  /  AlkPhos  89      PT/INR - ( 28 Sep 2017 20:53 )   PT: 12.6 sec;   INR: 1.13          PTT - ( 28 Sep 2017 20:53 )  PTT:31.8 sec    Urinalysis Basic - ( 28 Sep 2017 21:17 )    Color: Yellow / Appearance: Clear / S.010 / pH: x  Gluc: x / Ketone: NEGATIVE  / Bili: NEGATIVE / Urobili: 0.2 E.U./dL   Blood: x / Protein: NEGATIVE mg/dL / Nitrite: NEGATIVE   Leuk Esterase: NEGATIVE / RBC: x / WBC x   Sq Epi: x / Non Sq Epi: x / Bacteria: x

## 2017-09-30 NOTE — PROGRESS NOTE ADULT - PROBLEM SELECTOR PLAN 2
Patient without opioids for unknown number of days, now with morphine in thecal pump. Pt has not demonstrated signs and symptoms of withdrawal since admission.

## 2017-09-30 NOTE — PROGRESS NOTE ADULT - ASSESSMENT
Patient is a 56 yo F with PMHx of HTN, depression, anxiety, chronic pain with multiple past surgeries and intrathecal pump who presents from [facility] Pain management due to empty thecal pump for unknown number of days, monitoring for opioid withdrawal and optimization of current pain management regimen.

## 2017-09-30 NOTE — CONSULT NOTE ADULT - ASSESSMENT
Patient is a 56 yo F with PMHx of HTN, depression, anxiety, chronic pain with multiple past surgeries and intrathecal pump who presents from [facility] Pain management due to empty thecal pump for unknown number of days, monitoring for opioid withdrawal and optimization of current pain management regimen.     Problem/Plan - 1:  ·  Problem: Restarting intrathecal Opioid(morphine) pump  Plan: Patient without opioids for unknown number of days, now with morphine in thecal pump(not started).   - Continue to monitor for signs or symptoms of withdrawal (i.e. diaphoresis, nausea/vomiting, diarrhea, yawning)  - As per , restarting intrathecal pump needs close monitoring for RR and SaO2 because it needs to be started with a very low dose and increased gradually.   -Pt is hemodynamically stable since admission protecting her airway and has no signs of respiratory distress. No need for ICU admission. Pt can be monitored on      Problem/Plan - 2:  ·  Problem: Pain management    Plan: As per outpatient physician, oxycodone 10mg PO as breakthrough if needed, however hold if patient appears overly sedated.  - Per patient's private pain management doctor (Dr James), patient on Oxycontin 20 mg q12h, Oxycodone IR 10 mg q6h PRN and intrathecal pump.     FULL CODE  Dispo: 7 [facility]          Case discussed with Dr. Thapa  and updated primary team. Patient is a 56 yo F with PMHx of HTN, depression, anxiety, chronic pain with multiple past surgeries and intrathecal pump who presents from [facility] Pain management due to empty thecal pump for unknown number of days, monitoring for opioid withdrawal and optimization of current pain management regimen.     Problem/Plan - 1:  ·  Problem: Restarting intrathecal Opioid(morphine) pump  Plan: Patient without opioids for unknown number of days, now with morphine in thecal pump(not started).   - Continue to monitor for signs or symptoms of withdrawal (i.e. diaphoresis, nausea/vomiting, diarrhea, yawning)  - As per , restarting intrathecal pump needs close monitoring for RR and SaO2 because it needs to be started with a very low dose and increased gradually.   -Pt is hemodynamically stable since admission protecting her airway and has no signs of respiratory distress. No need for ICU admission. Pt can be monitored on Telemetry floor ([facility]) for restarting intrathecal pump.      Problem/Plan - 2:  ·  Problem: Pain management    Plan: As per outpatient physician, oxycodone 10mg PO as breakthrough if needed, however hold if patient appears overly sedated.  - Per patient's private pain management doctor (Dr James), patient on Oxycontin 20 mg q12h, Oxycodone IR 10 mg q6h PRN and intrathecal pump.     FULL CODE  Dispo: 7 [facility]    Case discussed with Dr. Thapa  and updated primary team. Patient is a 58 yo F with PMHx of HTN, depression, anxiety, chronic pain with multiple past surgeries and intrathecal pump who presents from [facility] Pain management due to empty thecal pump for unknown number of days, monitoring for opioid withdrawal and optimization of current pain management regimen.     Problem/Plan - 1:  ·  Problem: Restarting intrathecal Opioid(morphine) pump  Plan: Patient without opioids for unknown number of days, now with morphine in thecal pump(not started).   - Continue to monitor for signs or symptoms of withdrawal (i.e. diaphoresis, nausea/vomiting, diarrhea, yawning)  - As per , restarting intrathecal pump needs close monitoring for RR and SaO2 because it needs to be started with a very low dose and increased gradually.   -Pt is hemodynamically stable since admission protecting her airway and has no signs of respiratory distress. No need for ICU admission. Pt can be monitored on Telemetry floor ([facility]) for restarting intrathecal pump.      Problem/Plan - 2:  ·  Problem: Pain management    Plan: As per outpatient physician, oxycodone 10mg PO as breakthrough if needed, however hold if patient appears overly sedated.  - Per patient's private pain management doctor (Dr James), patient on Oxycontin 20 mg q12h, Oxycodone IR 10 mg q6h PRN and intrathecal pump.     FULL CODE  Dispo: [facility]    Case discussed with Dr. Thapa  and updated primary team. Patient is a 56 yo F with PMHx of HTN, depression, anxiety, chronic pain with multiple past surgeries and intrathecal pump who presents from [facility] Pain management due to empty thecal pump for unknown number of days, monitoring for opioid withdrawal and optimization of current pain management regimen.     Problem/Plan - 1:  ·  Problem: Restarting intrathecal Opioid(morphine) pump  Plan: Patient without opioids for unknown number of days, now with morphine in thecal pump(not started).   - Continue to monitor for signs or symptoms of withdrawal (i.e. diaphoresis, nausea/vomiting, diarrhea, yawning)  - As per , restarting intrathecal pump needs close monitoring for RR and SaO2 because it needs to be started with a very low dose and increased gradually.   -Pt is hemodynamically stable since admission protecting her airway and has no signs of respiratory distress. Pt is talking in full sentences. No need for ICU admission. Pt can be monitored on Telemetry floor ([facility]) for restarting intrathecal pump.      Problem/Plan - 2:  ·  Problem: Pain management    Plan: As per outpatient physician, oxycodone 10mg PO as breakthrough if needed, however hold if patient appears overly sedated.  - Per patient's private pain management doctor (Dr James), patient on Oxycontin 20 mg q12h, Oxycodone IR 10 mg q6h PRN and intrathecal pump.     FULL CODE  Dispo: 7 [facility]    Case discussed with Dr. Thapa  and updated primary team.

## 2017-09-30 NOTE — PROGRESS NOTE ADULT - PROBLEM SELECTOR PLAN 1
Pt was on Oxycontin 20mg daily on RMF. Pain not adequately control  - Plan to restart intrathecal pump per Dr. James  - Transfer to tele to monitor for respiratory and neuro status monitoring

## 2017-09-30 NOTE — PROGRESS NOTE ADULT - ASSESSMENT
Patient is a 56 yo F with PMHx of HTN, depression, anxiety, chronic pain with multiple past surgeries and intrathecal pump who presents from [facility] Pain management due to empty thecal pump for unknown number of days, monitoring for opioid withdrawal and optimization of current pain management regimen.

## 2017-09-30 NOTE — PROGRESS NOTE ADULT - PROBLEM SELECTOR PLAN 1
As per outpatient physician, oxycodone 10mg PO as breakthrough if needed, however hold if patient appears overly sedated.  - Per patient's private pain management doctor (Dr James), patient on Oxycontin 20 mg q12h, Oxycodone IR 10 mg q6h PRN and intrathecal pump.

## 2017-09-30 NOTE — PROGRESS NOTE ADULT - PROBLEM SELECTOR PLAN 2
Patient without opioids for unknown number of days, now with morphine in thecal pump(not started).   - Continue to monitor for signs or symptoms of withdrawal (i.e. diaphoresis, nausea/vomiting, diarrhea, yawning)  - As per , restarting intrathecal pump needs close monitoring for RR and SaO2 because it needs to be started with a very low dose and increased gradually.   -Pt is hemodynamically stable since admission protecting her airway and has no signs of respiratory distress. No need for ICU admission. Pt can be monitored on Telemetry floor ([facility]) for restarting intrathecal pump.

## 2017-10-01 DIAGNOSIS — F90.8 ATTENTION-DEFICIT HYPERACTIVITY DISORDER, OTHER TYPE: ICD-10-CM

## 2017-10-01 DIAGNOSIS — Z86.19 PERSONAL HISTORY OF OTHER INFECTIOUS AND PARASITIC DISEASES: ICD-10-CM

## 2017-10-01 DIAGNOSIS — G89.29 OTHER CHRONIC PAIN: ICD-10-CM

## 2017-10-01 DIAGNOSIS — K21.9 GASTRO-ESOPHAGEAL REFLUX DISEASE WITHOUT ESOPHAGITIS: ICD-10-CM

## 2017-10-01 LAB
ANION GAP SERPL CALC-SCNC: 9 MMOL/L — SIGNIFICANT CHANGE UP (ref 5–17)
BUN SERPL-MCNC: 11 MG/DL — SIGNIFICANT CHANGE UP (ref 7–23)
CALCIUM SERPL-MCNC: 9.4 MG/DL — SIGNIFICANT CHANGE UP (ref 8.4–10.5)
CHLORIDE SERPL-SCNC: 106 MMOL/L — SIGNIFICANT CHANGE UP (ref 96–108)
CO2 SERPL-SCNC: 23 MMOL/L — SIGNIFICANT CHANGE UP (ref 22–31)
CREAT SERPL-MCNC: 0.6 MG/DL — SIGNIFICANT CHANGE UP (ref 0.5–1.3)
GLUCOSE SERPL-MCNC: 99 MG/DL — SIGNIFICANT CHANGE UP (ref 70–99)
HCT VFR BLD CALC: 36.8 % — SIGNIFICANT CHANGE UP (ref 34.5–45)
HGB BLD-MCNC: 11.8 G/DL — SIGNIFICANT CHANGE UP (ref 11.5–15.5)
MAGNESIUM SERPL-MCNC: 2.1 MG/DL — SIGNIFICANT CHANGE UP (ref 1.6–2.6)
MCHC RBC-ENTMCNC: 25.8 PG — LOW (ref 27–34)
MCHC RBC-ENTMCNC: 32.1 G/DL — SIGNIFICANT CHANGE UP (ref 32–36)
MCV RBC AUTO: 80.5 FL — SIGNIFICANT CHANGE UP (ref 80–100)
PHOSPHATE SERPL-MCNC: 2.4 MG/DL — LOW (ref 2.5–4.5)
PLATELET # BLD AUTO: 202 K/UL — SIGNIFICANT CHANGE UP (ref 150–400)
POTASSIUM SERPL-MCNC: 4.5 MMOL/L — SIGNIFICANT CHANGE UP (ref 3.5–5.3)
POTASSIUM SERPL-SCNC: 4.5 MMOL/L — SIGNIFICANT CHANGE UP (ref 3.5–5.3)
RBC # BLD: 4.57 M/UL — SIGNIFICANT CHANGE UP (ref 3.8–5.2)
RBC # FLD: 13 % — SIGNIFICANT CHANGE UP (ref 10.3–16.9)
SODIUM SERPL-SCNC: 138 MMOL/L — SIGNIFICANT CHANGE UP (ref 135–145)
WBC # BLD: 7 K/UL — SIGNIFICANT CHANGE UP (ref 3.8–10.5)
WBC # FLD AUTO: 7 K/UL — SIGNIFICANT CHANGE UP (ref 3.8–10.5)

## 2017-10-01 PROCEDURE — 99232 SBSQ HOSP IP/OBS MODERATE 35: CPT | Mod: GC

## 2017-10-01 RX ORDER — OXYCODONE HYDROCHLORIDE 5 MG/1
10 TABLET ORAL
Qty: 0 | Refills: 0 | Status: DISCONTINUED | OUTPATIENT
Start: 2017-10-01 | End: 2017-10-03

## 2017-10-01 RX ORDER — CALCIUM CARBONATE 500(1250)
2 TABLET ORAL DAILY
Qty: 0 | Refills: 0 | Status: DISCONTINUED | OUTPATIENT
Start: 2017-10-01 | End: 2017-10-03

## 2017-10-01 RX ADMIN — MAGNESIUM OXIDE 400 MG ORAL TABLET 400 MILLIGRAM(S): 241.3 TABLET ORAL at 17:24

## 2017-10-01 RX ADMIN — OXYCODONE HYDROCHLORIDE 10 MILLIGRAM(S): 5 TABLET ORAL at 21:43

## 2017-10-01 RX ADMIN — HEPARIN SODIUM 5000 UNIT(S): 5000 INJECTION INTRAVENOUS; SUBCUTANEOUS at 14:22

## 2017-10-01 RX ADMIN — Medication 20 MILLIEQUIVALENT(S): at 11:59

## 2017-10-01 RX ADMIN — Medication 25 MILLIGRAM(S): at 06:30

## 2017-10-01 RX ADMIN — OXYCODONE HYDROCHLORIDE 20 MILLIGRAM(S): 5 TABLET ORAL at 13:20

## 2017-10-01 RX ADMIN — HEPARIN SODIUM 5000 UNIT(S): 5000 INJECTION INTRAVENOUS; SUBCUTANEOUS at 06:30

## 2017-10-01 RX ADMIN — Medication 50 MILLIGRAM(S): at 10:21

## 2017-10-01 RX ADMIN — OXYCODONE HYDROCHLORIDE 20 MILLIGRAM(S): 5 TABLET ORAL at 12:30

## 2017-10-01 RX ADMIN — Medication 0.25 MILLIGRAM(S): at 11:59

## 2017-10-01 RX ADMIN — VALSARTAN 320 MILLIGRAM(S): 80 TABLET ORAL at 07:32

## 2017-10-01 RX ADMIN — OXYCODONE HYDROCHLORIDE 10 MILLIGRAM(S): 5 TABLET ORAL at 17:27

## 2017-10-01 RX ADMIN — POLYETHYLENE GLYCOL 3350 17 GRAM(S): 17 POWDER, FOR SOLUTION ORAL at 11:59

## 2017-10-01 RX ADMIN — OXYCODONE HYDROCHLORIDE 10 MILLIGRAM(S): 5 TABLET ORAL at 08:28

## 2017-10-01 RX ADMIN — INFLUENZA VIRUS VACCINE 0.5 MILLILITER(S): 15; 15; 15; 15 SUSPENSION INTRAMUSCULAR at 12:29

## 2017-10-01 RX ADMIN — Medication 0.25 MILLIGRAM(S): at 02:57

## 2017-10-01 RX ADMIN — Medication 50 MILLIGRAM(S): at 17:24

## 2017-10-01 RX ADMIN — Medication 0.5 MILLIGRAM(S): at 21:43

## 2017-10-01 RX ADMIN — HEPARIN SODIUM 5000 UNIT(S): 5000 INJECTION INTRAVENOUS; SUBCUTANEOUS at 21:44

## 2017-10-01 RX ADMIN — DEXTROAMPHETAMINE SACCHARATE, AMPHETAMINE ASPARTATE, DEXTROAMPHETAMINE SULFATE AND AMPHETAMINE SULFATE 10 MILLIGRAM(S): 1.875; 1.875; 1.875; 1.875 TABLET ORAL at 12:30

## 2017-10-01 RX ADMIN — OXYCODONE HYDROCHLORIDE 10 MILLIGRAM(S): 5 TABLET ORAL at 22:44

## 2017-10-01 RX ADMIN — DEXTROAMPHETAMINE SACCHARATE, AMPHETAMINE ASPARTATE, DEXTROAMPHETAMINE SULFATE AND AMPHETAMINE SULFATE 20 MILLIGRAM(S): 1.875; 1.875; 1.875; 1.875 TABLET ORAL at 12:31

## 2017-10-01 RX ADMIN — OXYCODONE HYDROCHLORIDE 10 MILLIGRAM(S): 5 TABLET ORAL at 08:30

## 2017-10-01 RX ADMIN — MAGNESIUM OXIDE 400 MG ORAL TABLET 400 MILLIGRAM(S): 241.3 TABLET ORAL at 10:21

## 2017-10-01 RX ADMIN — OXYCODONE HYDROCHLORIDE 10 MILLIGRAM(S): 5 TABLET ORAL at 18:13

## 2017-10-01 NOTE — PROGRESS NOTE ADULT - SUBJECTIVE AND OBJECTIVE BOX
Patient is a 56 yo F with PMHx of HTN, h/o hep c cured, depression, anxiety, chronic pain with multiple past surgeries and intrathecal pump who presents from [facility] Pain management due to empty thecal pump for unknown number of days. Patient states that she has not had increased need but missed a refill by two weeks, and is not sure when she ran out. The patient has been having palpitations, sweating, and feeling 'not like herself' for the past four to seven days. She otherwise feels at baseline, feels a lot better now that her pump is refilled and has not needed any further increases in opioid medications. Patient denies fevers, chills, dizziness, changes in vision, fainting, chest pain, SOB, N/V/D. Last BM was two days ago, requesting dulcolax suppository for tonight and if it doesnt work then enema. Patient currently with HA on right side similar to past cluster headaches. (28 Sep 2017 22:25)      REVIEW OF SYSTEMS:  Constitutional: No fever, weight loss or fatigue  ENMT:  No difficulty hearing, tinnitus, vertigo; No sinus or throat pain  Respiratory: No cough, wheezing, chills or hemoptysis  Cardiovascular: No chest pain, palpitations, dizziness or leg swelling  Gastrointestinal: No abdominal or epigastric pain. No nausea, vomiting or hematemesis; No diarrhea or constipation. No melena or hematochezia.  Skin: No itching, burning, rashes or lesions   Musculoskeletal: No joint pain or swelling; No muscle, back or extremity pain    PAST MEDICAL & SURGICAL HISTORY:  Chronic pain  Hep C cured   HTN (hypertension)  H/O Spinal surgery  GERD  scoliosi, kyphosis    FAMILY HISTORY:  No pertinent family history in first degree relatives      SOCIAL HISTORY:  Smoking Status: [ ] Current, [ ] Former, [ ] Never  Pack Years:    MEDICATIONS:  MEDICATIONS  (STANDING):  clonazePAM Tablet 0.25 milliGRAM(s) Oral daily  clonazePAM Tablet 0.5 milliGRAM(s) Oral at bedtime  valsartan 320 milliGRAM(s) Oral daily  hydrochlorothiazide 25 milliGRAM(s) Oral daily  hydrOXYzine hydrochloride 50 milliGRAM(s) Oral every 8 hours  amphetamine/dextroamphetamine 10 milliGRAM(s) Oral daily  amphetamine/dextroamphetamine 20 milliGRAM(s) Oral daily  potassium chloride    Tablet ER 20 milliEquivalent(s) Oral daily  magnesium oxide 400 milliGRAM(s) Oral three times a day with meals  bisacodyl Suppository 20 milliGRAM(s) Rectal daily  heparin  Injectable 5000 Unit(s) SubCutaneous every 8 hours  influenza   Vaccine 0.5 milliLiter(s) IntraMuscular once  oxyCODONE  ER Tablet 20 milliGRAM(s) Oral daily  polyethylene glycol 3350 17 Gram(s) Oral daily    MEDICATIONS  (PRN):  clonazePAM Tablet 0.25 milliGRAM(s) Oral daily PRN Anxiety  oxyCODONE    IR 10 milliGRAM(s) Oral every 6 hours PRN Severe Pain (7 - 10)  calcium carbonate 500 mG (Tums) Chewable 2 Tablet(s) Chew daily PRN Heartburn      Allergies    Depakote (Anaphylaxis)  Haldol (Anaphylaxis)  Invega (Anaphylaxis)  Paxil (Anaphylaxis)  Steroids=Psychosis (Other)  Thorazine (Anaphylaxis)    Intolerances        Vital Signs Last 24 Hrs  T(C): 36.4 (01 Oct 2017 09:21), Max: 36.9 (30 Sep 2017 22:29)  T(F): 97.6 (01 Oct 2017 09:21), Max: 98.5 (30 Sep 2017 22:29)  HR: 76 (01 Oct 2017 10:26) (66 - 90)  BP: 122/71 (01 Oct 2017 10:26) (122/71 - 145/77)  BP(mean): 94 (01 Oct 2017 08:41) (94 - 97)  RR: 18 (01 Oct 2017 08:41) (17 - 24)  SpO2: 98% (01 Oct 2017 10:26) (98% - 100%)    09-30 @ 07:01  -  10-01 @ 07:00  --------------------------------------------------------  IN: 0 mL / OUT: 1300 mL / NET: -1300 mL    10-01 @ 07:01  -  10-01 @ 12:29  --------------------------------------------------------  IN: 0 mL / OUT: 300 mL / NET: -300 mL          PHYSICAL EXAM:    General: Well developed; well nourished; in no acute distress  HEENT: MMM, conjunctiva and sclera clear  Lungs: clear  Heart: regular  Gastrointestinal: Soft, non-tender non-distended; Normal bowel sounds; No rebound or guarding  Extremities: Normal range of motion, No clubbing, cyanosis or edema  Neurological: Alert and oriented x3  Skin: Warm and dry. No obvious rash      LABS:                        11.8   7.0   )-----------( 202      ( 01 Oct 2017 07:28 )             36.8     10-01    138  |  106  |  11  ----------------------------<  99  4.5   |  23  |  0.60    Ca    9.4      01 Oct 2017 07:28  Phos  2.4     10-01  Mg     2.1     10-01            RADIOLOGY & ADDITIONAL STUDIES:

## 2017-10-01 NOTE — PROGRESS NOTE ADULT - ASSESSMENT
This is a 56yo F w/ PMH of HTN, depression, anxiety, chronic pain with multiple past surgeries and intrathecal pump who presented 9/28 from [facility] Pain management due to empty thecal pump for unknown number of days, monitoring for opioid withdrawal and optimization of current pain management regimen. Now improving with PO pain control, pump refilled and s/p monitoring on telemetry being stepped down to [facility] in preparation for discharge and uptitration of intrathecal pump settings via pain mgmt on 10/2.

## 2017-10-01 NOTE — PROGRESS NOTE ADULT - PROBLEM SELECTOR PLAN 4
Adult ADHD  - mixed amphetamine salts (Adderall) 20mg PO daily @ 0800hrs and 10mg PO daily @ 1200hrs

## 2017-10-01 NOTE — PROGRESS NOTE ADULT - PROBLEM SELECTOR PLAN 1
Need for pain mgmt 2/2 intrathecal pump depletion; now since repleted  - as per outpatient physician, oxycodone 10mg PO as breakthrough if needed, however hold if patient appears overly sedated.  - Per patient's private pain management doctor (Dr James), patient on Oxycontin 20 mg q12h, Oxycodone IR 10 mg q6h PRN and intrathecal pump  - intrathecal pump repleted, pending uptitration tomorrow  - oxycodone 10 mg increased from q6h to 5 times a day   - Dr. James will make other adjustments tomorrow to pain regimen 10/2

## 2017-10-01 NOTE — PROGRESS NOTE ADULT - PROBLEM SELECTOR PLAN 7
DVT ppx: Heparin 5000 units SQ Q8hrs  No indication for stress ulcer ppx  FULL CODE  Dispo: [facility] DVT ppx: Heparin 5000 units SQ Q8hrs  No indication for stress ulcer ppx  FULL CODE  Dispo: F

## 2017-10-01 NOTE — PROGRESS NOTE ADULT - PROBLEM SELECTOR PLAN 3
-c/w home medications, Klonopin as prescribed -c/w home medications, Klonopin as prescribed  -consider Psych consult

## 2017-10-01 NOTE — PROGRESS NOTE ADULT - PROBLEM SELECTOR PLAN 1
As per outpatient physician, oxycodone 10mg PO as breakthrough if needed, however hold if patient appears overly sedated.  - Per patient's private pain management doctor (Dr James), patient on Oxycontin 20 mg q12h, Oxycodone IR 10 mg q6h PRN and intrathecal pump  -f/u Dr. James recs As per outpatient physician, oxycodone 10mg PO as breakthrough if needed, however hold if patient appears overly sedated.  - Per patient's private pain management doctor (Dr James), patient on Oxycontin 20 mg q12h, Oxycodone IR 10 mg q6h PRN and intrathecal pump  -intrathecal pump restarted yesterday, will increase tomorrow  -oxycodone 10 mg increased from q6h to 5 times a day   -Dr. James will make other adjustments tomorrow to pain regimen  -consider pain management consult

## 2017-10-01 NOTE — PROGRESS NOTE ADULT - SUBJECTIVE AND OBJECTIVE BOX
OVERNIGHT EVENTS: NAEO    SUBJECTIVE / INTERVAL HPI: Patient seen and examined at bedside, resting comfortably in bed. NAD. No respiratory distress. Pt appearing comfortable and denies any pain or discomfort. Denies HA, dizziness, nausea/vomiting, abdominal pain, SOB, THOMAS, chest pain, palpitations, urinary symptoms, LE pain, sweats, tremors. Other ROS negative. No other complaints.     VITAL SIGNS:  Vital Signs Last 24 Hrs  T(C): 36.6 (01 Oct 2017 06:34), Max: 36.9 (30 Sep 2017 22:29)  T(F): 97.9 (01 Oct 2017 06:34), Max: 98.5 (30 Sep 2017 22:29)  HR: 68 (01 Oct 2017 05:55) (68 - 90)  BP: 145/77 (01 Oct 2017 05:55) (125/78 - 145/77)   BP(mean): 97 (01 Oct 2017 01:00) (97 - 97)  RR: 18 (01 Oct 2017 05:55) (17 - 24)  SpO2: 98% (01 Oct 2017 05:55) (98% - 100%)    PHYSICAL EXAM:    General: NAD, no respiratory disrress, well-nourished, appearing comfortable, alert/awake  HEENT: NCAT, PERRL, clear conjunctiva, no scleral icterus  Neck: supple, no JVD  Respiratory: CTA b/l, no wheezing, rhonchi, rales  Cardiovascular: RRR, normal S1S2, no M/R/G  Abdomen: soft, NT/ND, bowel sounds normoactive throughout, RLQ subcutaneous pump in place. No signs of infection  Extremities: WWP, no clubbing, cyanosis, or edema  Neuro   Mental Status: AAOx3; Speech is fluent. Flat affect, no tremors   Skin: 2 lesions upper and lower(erythematous)    MEDICATIONS:  MEDICATIONS  (STANDING):  clonazePAM Tablet 0.25 milliGRAM(s) Oral daily  clonazePAM Tablet 0.5 milliGRAM(s) Oral at bedtime  valsartan 320 milliGRAM(s) Oral daily  hydrochlorothiazide 25 milliGRAM(s) Oral daily  hydrOXYzine hydrochloride 50 milliGRAM(s) Oral every 8 hours  amphetamine/dextroamphetamine 10 milliGRAM(s) Oral daily  amphetamine/dextroamphetamine 20 milliGRAM(s) Oral daily  potassium chloride    Tablet ER 20 milliEquivalent(s) Oral daily  magnesium oxide 400 milliGRAM(s) Oral three times a day with meals  bisacodyl Suppository 20 milliGRAM(s) Rectal daily  heparin  Injectable 5000 Unit(s) SubCutaneous every 8 hours  influenza   Vaccine 0.5 milliLiter(s) IntraMuscular once  oxyCODONE  ER Tablet 20 milliGRAM(s) Oral daily  polyethylene glycol 3350 17 Gram(s) Oral daily    MEDICATIONS  (PRN):  clonazePAM Tablet 0.25 milliGRAM(s) Oral daily PRN Anxiety  oxyCODONE    IR 10 milliGRAM(s) Oral every 6 hours PRN Severe Pain (7 - 10)      ALLERGIES:  Allergies    Depakote (Anaphylaxis)  Haldol (Anaphylaxis)  Invega (Anaphylaxis)  Paxil (Anaphylaxis)  Steroids=Psychosis (Other)  Thorazine (Anaphylaxis)    Intolerances        LABS:                        11.8   7.0   )-----------( 202      ( 01 Oct 2017 07:28 )             36.8     09-30    141  |  103  |  12  ----------------------------<  120<H>  3.3<L>   |  23  |  0.64    Ca    9.1      30 Sep 2017 08:47  Mg     2.1     09-30          CAPILLARY BLOOD GLUCOSE          RADIOLOGY & ADDITIONAL TESTS: Reviewed. [facility] STEPDOWN F  PGY-1 TRANSFER NOTE    HOSPITAL COURSE:  57F with PMHx of HTN, depression, anxiety, chronic pain with multiple past surgeries and intrathecal pump who presents from [facility] Pain management due to empty thecal pump for unknown number of days, monitoring for opioid withdrawal and optimization of current pain management regimen. Pt was restarted on home pain medications and adjusted as per Dr. James (primary). Intrathecal pump was restarted yesterday per Larry James and will run for 24hrs prior to Dr. James increasing the dosaging. He will increase the pump tomorrow. Pt has remained HDS. Respiratory status monitored closely in setting of multiple opioid administration. No withdrawal sxs noted. She was resumed on her other home medications. No events on tele o/n. Pt otherwise medically optimized and stable for stepdown to [facility]. Dr. James will see pt to continue with pain regimen adjustments.     OVERNIGHT EVENTS: NAEO    SUBJECTIVE / INTERVAL HPI: Patient seen and examined at bedside, resting comfortably in bed. NAD. No respiratory distress. Pt appearing comfortable and denies any pain or discomfort. Denies HA, dizziness, nausea/vomiting, abdominal pain, SOB, THOMAS, chest pain, palpitations, urinary symptoms, LE pain, sweats, tremors. Other ROS negative. No other complaints.     VITAL SIGNS:  Vital Signs Last 24 Hrs  T(C): 36.6 (01 Oct 2017 06:34), Max: 36.9 (30 Sep 2017 22:29)  T(F): 97.9 (01 Oct 2017 06:34), Max: 98.5 (30 Sep 2017 22:29)  HR: 68 (01 Oct 2017 05:55) (68 - 90)  BP: 145/77 (01 Oct 2017 05:55) (125/78 - 145/77)   BP(mean): 97 (01 Oct 2017 01:00) (97 - 97)  RR: 18 (01 Oct 2017 05:55) (17 - 24)  SpO2: 98% (01 Oct 2017 05:55) (98% - 100%)    PHYSICAL EXAM:    General: NAD, no respiratory disrress, well-nourished, appearing comfortable, alert/awake  HEENT: NCAT, PERRL, clear conjunctiva, no scleral icterus  Neck: supple, no JVD  Respiratory: CTA b/l, no wheezing, rhonchi, rales  Cardiovascular: RRR, normal S1S2, no M/R/G  Abdomen: soft, NT/ND, bowel sounds normoactive throughout, RLQ subcutaneous pump in place. No signs of infection  Extremities: WWP, no clubbing, cyanosis, or edema  Neuro   Mental Status: AAOx3; Speech is fluent. Flat affect, no tremors   Skin: 2 lesions upper and lower(erythematous)    MEDICATIONS:  MEDICATIONS  (STANDING):  clonazePAM Tablet 0.25 milliGRAM(s) Oral daily  clonazePAM Tablet 0.5 milliGRAM(s) Oral at bedtime  valsartan 320 milliGRAM(s) Oral daily  hydrochlorothiazide 25 milliGRAM(s) Oral daily  hydrOXYzine hydrochloride 50 milliGRAM(s) Oral every 8 hours  amphetamine/dextroamphetamine 10 milliGRAM(s) Oral daily  amphetamine/dextroamphetamine 20 milliGRAM(s) Oral daily  potassium chloride    Tablet ER 20 milliEquivalent(s) Oral daily  magnesium oxide 400 milliGRAM(s) Oral three times a day with meals  bisacodyl Suppository 20 milliGRAM(s) Rectal daily  heparin  Injectable 5000 Unit(s) SubCutaneous every 8 hours  influenza   Vaccine 0.5 milliLiter(s) IntraMuscular once  oxyCODONE  ER Tablet 20 milliGRAM(s) Oral daily  polyethylene glycol 3350 17 Gram(s) Oral daily    MEDICATIONS  (PRN):  clonazePAM Tablet 0.25 milliGRAM(s) Oral daily PRN Anxiety  oxyCODONE    IR 10 milliGRAM(s) Oral every 6 hours PRN Severe Pain (7 - 10)      ALLERGIES:  Allergies    Depakote (Anaphylaxis)  Haldol (Anaphylaxis)  Invega (Anaphylaxis)  Paxil (Anaphylaxis)  Steroids=Psychosis (Other)  Thorazine (Anaphylaxis)    Intolerances        LABS:                        11.8   7.0   )-----------( 202      ( 01 Oct 2017 07:28 )             36.8     09-30    141  |  103  |  12  ----------------------------<  120<H>  3.3<L>   |  23  |  0.64    Ca    9.1      30 Sep 2017 08:47  Mg     2.1     09-30          CAPILLARY BLOOD GLUCOSE          RADIOLOGY & ADDITIONAL TESTS: Reviewed.

## 2017-10-01 NOTE — PROGRESS NOTE ADULT - PROBLEM SELECTOR PLAN 2
Patient without opioids for unknown number of days, now with morphine in thecal pump(not started).   - Continue to monitor for signs or symptoms of withdrawal (i.e. diaphoresis, nausea/vomiting, diarrhea, yawning)  - As per , restarting intrathecal pump needs close monitoring for RR and SaO2 because it needs to be started with a very low dose and increased gradually.   -Pt is hemodynamically stable since admission protecting her airway and has no signs of respiratory distress. No need for ICU admission. Pt can be monitored on Telemetry floor ([address] [place], [place] [id number]) for restarting intrathecal pump.  - will f/u Dr. James  -pt currently asymptomatic Patient without opioids for unknown number of days, now with morphine in thecal pump(not started).   - Continue to monitor for signs or symptoms of withdrawal (i.e. diaphoresis, nausea/vomiting, diarrhea, yawning)  - As per , restarting intrathecal pump needs close monitoring for RR and SaO2 because it needs to be started with a very low dose and increased gradually.   -Pt is hemodynamically stable since admission protecting her airway and has no signs of respiratory distress. No need for ICU admission. Pt can be monitored on Telemetry floor ([facility]) for restarting intrathecal pump.  - will f/u Dr. James  -pt currently asymptomatic  -respiratory status stable  -need to call Dr. James if RR <10

## 2017-10-01 NOTE — PROGRESS NOTE ADULT - SUBJECTIVE AND OBJECTIVE BOX
PGY-2 MEDICINE TRANSFER ACCEPTANCE NOTE    Hospital Course:    This is a 58yo F w/ PMH of HTN, depression, anxiety, chronic pain with multiple past surgeries and intrathecal pump who presented from [facility] Pain management due to empty thecal pump for unknown number of days, monitoring for opioid withdrawal and optimization of current pain management regimen. Pt was restarted on home pain medications and adjusted as per Dr. James (primary pain mgmt). Intrathecal pump was restarted 9/30 per Larry James and will run for 24hrs prior to Dr. James increasing the dose rate. He will increase the pump on 10/2. Pt has remained HDS throughout hospitalization. Respiratory status monitored closely in setting of multiple opioid administration. No withdrawal sxs noted. She was resumed on her other home medications. No events on tele while on [employer] service. Pt otherwise medically optimized and stable for stepdown to [facility]. Dr. James will see pt to continue with pain regimen adjustments on 10/2. She was received HDS to [facility] medicine and care plan was discussed via in-[facility]/ [facility] by myself.     SUBJECTIVE / INTERVAL HPI: Patient seen and examined at bedside. She has no acute complaints at this time.     VITAL SIGNS:  Vital Signs Last 24 Hrs  T(C): 37.5 (01 Oct 2017 15:43), Max: 37.5 (01 Oct 2017 15:43)  T(F): 99.5 (01 Oct 2017 15:43), Max: 99.5 (01 Oct 2017 15:43)  HR: 98 (01 Oct 2017 15:43) (66 - 98)  BP: 121/79 (01 Oct 2017 15:43) (109/63 - 145/77)  BP(mean): 81 (01 Oct 2017 13:10) (81 - 97)  RR: 17 (01 Oct 2017 15:43) (17 - 20)  SpO2: 99% (01 Oct 2017 15:43) (98% - 100%)    PHYSICAL EXAM:    General: WDWN  HEENT: NC/AT; PERRL, anicteric sclera; MMM  Neck: supple  Cardiovascular: +S1/S2, RRR  Respiratory: CTA B/L; no W/R/R  Gastrointestinal: soft, NT/ND w/ RLQ indwelling pump; normoactive BS  Extremities: WWP; no edema, clubbing or cyanosis  Vascular: 2+ radial, DP/PT pulses B/L  Neurological: AAOx3; no focal deficits    MEDICATIONS:  MEDICATIONS  (STANDING):  clonazePAM Tablet 0.25 milliGRAM(s) Oral daily  clonazePAM Tablet 0.5 milliGRAM(s) Oral at bedtime  valsartan 320 milliGRAM(s) Oral daily  hydrochlorothiazide 25 milliGRAM(s) Oral daily  hydrOXYzine hydrochloride 50 milliGRAM(s) Oral every 8 hours  amphetamine/dextroamphetamine 10 milliGRAM(s) Oral daily  amphetamine/dextroamphetamine 20 milliGRAM(s) Oral daily  potassium chloride    Tablet ER 20 milliEquivalent(s) Oral daily  magnesium oxide 400 milliGRAM(s) Oral three times a day with meals  bisacodyl Suppository 20 milliGRAM(s) Rectal daily  heparin  Injectable 5000 Unit(s) SubCutaneous every 8 hours  oxyCODONE  ER Tablet 20 milliGRAM(s) Oral daily  polyethylene glycol 3350 17 Gram(s) Oral daily    MEDICATIONS  (PRN):  clonazePAM Tablet 0.25 milliGRAM(s) Oral daily PRN Anxiety  calcium carbonate 500 mG (Tums) Chewable 2 Tablet(s) Chew daily PRN Heartburn  oxyCODONE    IR 10 milliGRAM(s) Oral five times a day PRN Severe Pain (7 - 10)      ALLERGIES:  Allergies    Depakote (Anaphylaxis)  Haldol (Anaphylaxis)  Invega (Anaphylaxis)  Paxil (Anaphylaxis)  Steroids=Psychosis (Other)  Thorazine (Anaphylaxis)    Intolerances        LABS:                        11.8   7.0   )-----------( 202      ( 01 Oct 2017 07:28 )             36.8     10-01    138  |  106  |  11  ----------------------------<  99  4.5   |  23  |  0.60    Ca    9.4      01 Oct 2017 07:28  Phos  2.4     10-01  Mg     2.1     10-01          CAPILLARY BLOOD GLUCOSE          RADIOLOGY & ADDITIONAL TESTS: Reviewed.

## 2017-10-01 NOTE — PROGRESS NOTE ADULT - PROBLEM SELECTOR PLAN 3
Currently well controlled on current home meds  - continue home medications and home Klonopin regimen as currently ordered  - no s/sx respiratory compromise on multiple sedating meds, klonopin dosing

## 2017-10-01 NOTE — PROGRESS NOTE ADULT - PROBLEM SELECTOR PLAN 2
Patient was without opioids for unknown number of days, now with morphine in thecal pump s/p refill  - continue monitoring for s/sx opiate w/d (none so far reported per [facility] team)  - Following Dr. James (pain mgmt) recs and all intrathecal pump mgmt via pain mgmt   - per recommendations will call Dr. James if RR <10

## 2017-10-01 NOTE — PROGRESS NOTE ADULT - ATTENDING COMMENTS
Patient seen and examined with house-staff during bedside rounds.  Resident note read, including vitals, physical findings, laboratory data, and radiological reports.   Revisions included below.  Direct personal management at bed side and extensive interpretation of the data.  Plan was outlined and discussed in details with the housestaff.  Decision making of high complexity  the respiratory status is stable. There is no clinical evidence of respiratory depression.  That pain management saw the patient and I discus with them.  continue pain medication and transferred the patient to regular floor

## 2017-10-02 ENCOUNTER — TRANSCRIPTION ENCOUNTER (OUTPATIENT)
Age: 57
End: 2017-10-02

## 2017-10-02 DIAGNOSIS — I10 ESSENTIAL (PRIMARY) HYPERTENSION: ICD-10-CM

## 2017-10-02 DIAGNOSIS — R30.0 DYSURIA: ICD-10-CM

## 2017-10-02 DIAGNOSIS — F11.23 OPIOID DEPENDENCE WITH WITHDRAWAL: ICD-10-CM

## 2017-10-02 LAB
ANION GAP SERPL CALC-SCNC: 12 MMOL/L — SIGNIFICANT CHANGE UP (ref 5–17)
APPEARANCE UR: CLEAR — SIGNIFICANT CHANGE UP
BILIRUB UR-MCNC: NEGATIVE — SIGNIFICANT CHANGE UP
BUN SERPL-MCNC: 13 MG/DL — SIGNIFICANT CHANGE UP (ref 7–23)
CALCIUM SERPL-MCNC: 9.6 MG/DL — SIGNIFICANT CHANGE UP (ref 8.4–10.5)
CHLORIDE SERPL-SCNC: 102 MMOL/L — SIGNIFICANT CHANGE UP (ref 96–108)
CO2 SERPL-SCNC: 24 MMOL/L — SIGNIFICANT CHANGE UP (ref 22–31)
COLOR SPEC: YELLOW — SIGNIFICANT CHANGE UP
CREAT SERPL-MCNC: 0.73 MG/DL — SIGNIFICANT CHANGE UP (ref 0.5–1.3)
DIFF PNL FLD: (no result)
GLUCOSE SERPL-MCNC: 97 MG/DL — SIGNIFICANT CHANGE UP (ref 70–99)
GLUCOSE UR QL: NEGATIVE — SIGNIFICANT CHANGE UP
HCT VFR BLD CALC: 37.4 % — SIGNIFICANT CHANGE UP (ref 34.5–45)
HGB BLD-MCNC: 12.3 G/DL — SIGNIFICANT CHANGE UP (ref 11.5–15.5)
KETONES UR-MCNC: NEGATIVE — SIGNIFICANT CHANGE UP
LEUKOCYTE ESTERASE UR-ACNC: (no result)
MAGNESIUM SERPL-MCNC: 2.2 MG/DL — SIGNIFICANT CHANGE UP (ref 1.6–2.6)
MCHC RBC-ENTMCNC: 26.4 PG — LOW (ref 27–34)
MCHC RBC-ENTMCNC: 32.9 G/DL — SIGNIFICANT CHANGE UP (ref 32–36)
MCV RBC AUTO: 80.3 FL — SIGNIFICANT CHANGE UP (ref 80–100)
NITRITE UR-MCNC: POSITIVE
PH UR: 7 — SIGNIFICANT CHANGE UP (ref 5–8)
PLATELET # BLD AUTO: 198 K/UL — SIGNIFICANT CHANGE UP (ref 150–400)
POTASSIUM SERPL-MCNC: 4.5 MMOL/L — SIGNIFICANT CHANGE UP (ref 3.5–5.3)
POTASSIUM SERPL-SCNC: 4.5 MMOL/L — SIGNIFICANT CHANGE UP (ref 3.5–5.3)
PROT UR-MCNC: NEGATIVE MG/DL — SIGNIFICANT CHANGE UP
RBC # BLD: 4.66 M/UL — SIGNIFICANT CHANGE UP (ref 3.8–5.2)
RBC # FLD: 13.2 % — SIGNIFICANT CHANGE UP (ref 10.3–16.9)
SODIUM SERPL-SCNC: 138 MMOL/L — SIGNIFICANT CHANGE UP (ref 135–145)
SP GR SPEC: 1.02 — SIGNIFICANT CHANGE UP (ref 1–1.03)
UROBILINOGEN FLD QL: 0.2 E.U./DL — SIGNIFICANT CHANGE UP
WBC # BLD: 6.6 K/UL — SIGNIFICANT CHANGE UP (ref 3.8–10.5)
WBC # FLD AUTO: 6.6 K/UL — SIGNIFICANT CHANGE UP (ref 3.8–10.5)

## 2017-10-02 RX ORDER — PHENAZOPYRIDINE HCL 100 MG
100 TABLET ORAL EVERY 8 HOURS
Qty: 0 | Refills: 0 | Status: DISCONTINUED | OUTPATIENT
Start: 2017-10-02 | End: 2017-10-03

## 2017-10-02 RX ORDER — BACITRACIN ZINC 500 UNIT/G
1 OINTMENT IN PACKET (EA) TOPICAL THREE TIMES A DAY
Qty: 0 | Refills: 0 | Status: DISCONTINUED | OUTPATIENT
Start: 2017-10-02 | End: 2017-10-03

## 2017-10-02 RX ORDER — HYDROCORTISONE 1 %
1 OINTMENT (GRAM) TOPICAL THREE TIMES A DAY
Qty: 0 | Refills: 0 | Status: DISCONTINUED | OUTPATIENT
Start: 2017-10-02 | End: 2017-10-03

## 2017-10-02 RX ADMIN — MAGNESIUM OXIDE 400 MG ORAL TABLET 400 MILLIGRAM(S): 241.3 TABLET ORAL at 09:11

## 2017-10-02 RX ADMIN — MAGNESIUM OXIDE 400 MG ORAL TABLET 400 MILLIGRAM(S): 241.3 TABLET ORAL at 11:34

## 2017-10-02 RX ADMIN — Medication 100 MILLIGRAM(S): at 15:31

## 2017-10-02 RX ADMIN — DEXTROAMPHETAMINE SACCHARATE, AMPHETAMINE ASPARTATE, DEXTROAMPHETAMINE SULFATE AND AMPHETAMINE SULFATE 20 MILLIGRAM(S): 1.875; 1.875; 1.875; 1.875 TABLET ORAL at 11:34

## 2017-10-02 RX ADMIN — OXYCODONE HYDROCHLORIDE 10 MILLIGRAM(S): 5 TABLET ORAL at 08:45

## 2017-10-02 RX ADMIN — DEXTROAMPHETAMINE SACCHARATE, AMPHETAMINE ASPARTATE, DEXTROAMPHETAMINE SULFATE AND AMPHETAMINE SULFATE 10 MILLIGRAM(S): 1.875; 1.875; 1.875; 1.875 TABLET ORAL at 11:34

## 2017-10-02 RX ADMIN — OXYCODONE HYDROCHLORIDE 20 MILLIGRAM(S): 5 TABLET ORAL at 11:35

## 2017-10-02 RX ADMIN — Medication 50 MILLIGRAM(S): at 09:11

## 2017-10-02 RX ADMIN — HEPARIN SODIUM 5000 UNIT(S): 5000 INJECTION INTRAVENOUS; SUBCUTANEOUS at 07:36

## 2017-10-02 RX ADMIN — OXYCODONE HYDROCHLORIDE 20 MILLIGRAM(S): 5 TABLET ORAL at 12:05

## 2017-10-02 RX ADMIN — Medication 1 TABLET(S): at 13:43

## 2017-10-02 RX ADMIN — OXYCODONE HYDROCHLORIDE 10 MILLIGRAM(S): 5 TABLET ORAL at 17:45

## 2017-10-02 RX ADMIN — Medication 50 MILLIGRAM(S): at 01:12

## 2017-10-02 RX ADMIN — POLYETHYLENE GLYCOL 3350 17 GRAM(S): 17 POWDER, FOR SOLUTION ORAL at 11:35

## 2017-10-02 RX ADMIN — OXYCODONE HYDROCHLORIDE 10 MILLIGRAM(S): 5 TABLET ORAL at 07:39

## 2017-10-02 RX ADMIN — Medication 20 MILLIEQUIVALENT(S): at 11:36

## 2017-10-02 RX ADMIN — MAGNESIUM OXIDE 400 MG ORAL TABLET 400 MILLIGRAM(S): 241.3 TABLET ORAL at 17:45

## 2017-10-02 RX ADMIN — Medication 0.25 MILLIGRAM(S): at 01:13

## 2017-10-02 RX ADMIN — Medication 0.5 MILLIGRAM(S): at 22:47

## 2017-10-02 RX ADMIN — HEPARIN SODIUM 5000 UNIT(S): 5000 INJECTION INTRAVENOUS; SUBCUTANEOUS at 22:47

## 2017-10-02 RX ADMIN — HEPARIN SODIUM 5000 UNIT(S): 5000 INJECTION INTRAVENOUS; SUBCUTANEOUS at 13:43

## 2017-10-02 RX ADMIN — Medication 50 MILLIGRAM(S): at 17:45

## 2017-10-02 RX ADMIN — VALSARTAN 320 MILLIGRAM(S): 80 TABLET ORAL at 07:35

## 2017-10-02 RX ADMIN — Medication 100 MILLIGRAM(S): at 22:47

## 2017-10-02 RX ADMIN — Medication 0.25 MILLIGRAM(S): at 11:35

## 2017-10-02 RX ADMIN — OXYCODONE HYDROCHLORIDE 10 MILLIGRAM(S): 5 TABLET ORAL at 18:15

## 2017-10-02 RX ADMIN — Medication 25 MILLIGRAM(S): at 07:36

## 2017-10-02 NOTE — PROGRESS NOTE ADULT - ASSESSMENT
58 yo F with PMH of HTN, depression, anxiety, chronic pain due to multiple past surgeries and intrathecal pump presented to ED on 9/28 from [facility] pain management due to empty thecal pump for an unknown amount of time. Pt admitted to monitor for opoid withdrawal and to start and optimize the dosing of her thecal pump. Pump refilled on 9/30 and dose is to be uptitrated by Dr. James 58 yo F with PMH of HTN, depression, anxiety, chronic pain due to multiple past surgeries and intrathecal pump presented to ED on 9/28 from [facility] pain management due to empty thecal pump for an unknown amount of time. Pt admitted to monitor for opoid withdrawal and to start and optimize the dosing of her thecal pump. Pump refilled on 9/30 and dose is uptitrated by Dr. James today. Pt has new complaint of dysuria, foul smelling urine, dysuria, burning upon urination, a feeling of incomplete voiding and suprapubic tenderness s/p catheter placement/removal on 9/30

## 2017-10-02 NOTE — DISCHARGE NOTE ADULT - HOSPITAL COURSE
58yo F w/ PMH of HTN, depression, anxiety, chronic pain with multiple past surgeries and intrathecal pump who presented from [facility] Pain management due to empty thecal pump for unknown number of days, monitoring for opioid withdrawal and optimization of current pain management regimen. Pt was restarted on home pain medications and adjusted as per Dr. James (primary pain mgmt). Intrathecal pump was restarted 9/30 per Larry James and will run for 24hrs prior to Dr. James increasing the dose rate. Pt has remained HDS throughout hospitalization. Respiratory status monitored closely in setting of multiple opioid administration. No withdrawal sxs noted. She was resumed on her other home medications. No events on tele while on [employer] service. Pt otherwise medically optimized and stable for stepdown to [facility]. Dr. James will see pt to continue with pain regimen adjustments on 10/2. She was received HDS to [facility]. He increased the pump on 10/2 with no complications. Pt medically stable and ready for discharge. To continue to follow up with [facility] Pain management outpatient.

## 2017-10-02 NOTE — DISCHARGE NOTE ADULT - PLAN OF CARE
Follow up with [facility] pain management outpatient Dr. James refilled your empty intrathecal pump and uptitrated it during your stay in the hospital while monitoring for signs of respiratory depression. You had been placed on oxycodone to help with opioid withdrawal and chronic pain during the restarting/uptitrating period. Please continue to follow up with [facility] pain management and Dr. James for appropriate management of your chronic pain. You have an intrathecal pump to manage your chronic pain. Please continue to follow up with [facility] pain management regarding further management of your chronic pain. You were diagnosed with a urinary tract infection. We started you on antibiotics (Bactrim double strength tablet twice a day) (10/2- ) to be taken for 5 days total, until 10/6. Urinary tract infections, also called "UTIs," are infections that affect either the bladder or the kidneys. Bladder infections are more common than kidney infections. Bladder infections happen when bacteria get into the urethra and travel up into the bladder. Kidney infections happen when the bacteria travel even higher, up into the kidneys. The symptoms of a bladder infection include pain or a burning feeling when you urinate, the need to urinate often, the need to urinate suddenly or in a hurry, blood in the urine. Signs that an infection has spread to the kidneys include fever, back pain, or nausea/vomiting. It is important that you take your antibiotics as prescribed and to completion to properly treat your urinary tract infection and prevent antibiotic resistance. Please continue with your klonopin. Follow up with your primary care physician regarding further management of your anxiety. Continue with your diovan to help management your elevated blood pressure. Hypertension is the medical term for high blood pressure. Blood pressure refers to the pressure that blood applies to the inner walls of the arteries. Arteries carry blood from the heart to other organs and parts of the body. Untreated high blood pressure increases the strain on the heart and arteries, eventually causing organ damage. High blood pressure increases the risk of heart failure, heart attack (myocardial infarction), stroke, and kidney failure. High blood pressure does not usually cause any symptoms. Treatment of hypertension usually begins with lifestyle changes. Making these lifestyle changes involves little or no risk. Recommended changes often include reducing the amount of salt in your diet, losing weight if you are overweight or obese, avoiding drinking too much alcohol, stopping smoking and exercising at least 30 minutes per day most days of the week. If you are prescribed medication for your hypertension it is important to take these as prescribed to prevent the possible complications of uncontrolled hypertension.

## 2017-10-02 NOTE — DISCHARGE NOTE ADULT - CARE PLAN
Principal Discharge DX:	Opioid withdrawal  Goal:	Follow up with [facility] pain management outpatient  Instructions for follow-up, activity and diet:	Dr. James refilled your empty intrathecal pump and uptitrated it during your stay in the hospital while monitoring for signs of respiratory depression. You had been placed on oxycodone to help with opioid withdrawal and chronic pain during the restarting/uptitrating period. Please continue to follow up with [facility] pain management and Dr. James for appropriate management of your chronic pain.  Secondary Diagnosis:	Pain management  Instructions for follow-up, activity and diet:	You have an intrathecal pump to manage your chronic pain. Please continue to follow up with [facility] pain management regarding further management of your chronic pain.  Secondary Diagnosis:	UTI (urinary tract infection)  Instructions for follow-up, activity and diet:	You were diagnosed with a urinary tract infection. We started you on antibiotics (Bactrim double strength tablet twice a day) (10/2- ) to be taken for 5 days total, until 10/6. Urinary tract infections, also called "UTIs," are infections that affect either the bladder or the kidneys. Bladder infections are more common than kidney infections. Bladder infections happen when bacteria get into the urethra and travel up into the bladder. Kidney infections happen when the bacteria travel even higher, up into the kidneys. The symptoms of a bladder infection include pain or a burning feeling when you urinate, the need to urinate often, the need to urinate suddenly or in a hurry, blood in the urine. Signs that an infection has spread to the kidneys include fever, back pain, or nausea/vomiting. It is important that you take your antibiotics as prescribed and to completion to properly treat your urinary tract infection and prevent antibiotic resistance.  Secondary Diagnosis:	Anxiety  Instructions for follow-up, activity and diet:	Please continue with your klonopin. Follow up with your primary care physician regarding further management of your anxiety.  Secondary Diagnosis:	HTN (hypertension)  Instructions for follow-up, activity and diet:	Continue with your diovan to help management your elevated blood pressure. Hypertension is the medical term for high blood pressure. Blood pressure refers to the pressure that blood applies to the inner walls of the arteries. Arteries carry blood from the heart to other organs and parts of the body. Untreated high blood pressure increases the strain on the heart and arteries, eventually causing organ damage. High blood pressure increases the risk of heart failure, heart attack (myocardial infarction), stroke, and kidney failure. High blood pressure does not usually cause any symptoms. Treatment of hypertension usually begins with lifestyle changes. Making these lifestyle changes involves little or no risk. Recommended changes often include reducing the amount of salt in your diet, losing weight if you are overweight or obese, avoiding drinking too much alcohol, stopping smoking and exercising at least 30 minutes per day most days of the week. If you are prescribed medication for your hypertension it is important to take these as prescribed to prevent the possible complications of uncontrolled hypertension.

## 2017-10-02 NOTE — PROGRESS NOTE ADULT - PROBLEM SELECTOR PLAN 8
DVT PPx: HSQ    CODE: FULL.    DISPO: Monitor overnight s/p uptitration of intrathecal pump; possible D/C tomorrow

## 2017-10-02 NOTE — DISCHARGE NOTE ADULT - PATIENT PORTAL LINK FT
“You can access the [employer] Patient Portal, offered by [facility], by registering with the following website: [email or web address]”

## 2017-10-02 NOTE — DISCHARGE NOTE ADULT - MEDICATION SUMMARY - MEDICATIONS TO TAKE
I will START or STAY ON the medications listed below when I get home from the hospital:    oxyCODONE 20 mg oral tablet, extended release  -- 1 tab(s) by mouth once a day  -- Indication: For Chronic pain    oxyCODONE 10 mg oral tablet  -- 1 tab(s) by mouth 5 times a day, As needed, Severe Pain (7 - 10)  -- Indication: For Chronic pain    KlonoPIN 0.5 mg oral tablet  -- 0.5 milligram(s) by mouth once a day (at bedtime)  -- Indication: For Anxiety    KlonoPIN 0.25 mg oral tablet  -- 0.25 milligram(s) by mouth once a day  -- Indication: For Anxiety    KlonoPIN 0.25 mg oral tablet  -- 0.25 milligram(s) by mouth once a day, As Needed  -- Indication: For Anxiety    Diovan  mg-25 mg oral tablet  -- 1 tab(s) by mouth once a day  -- Indication: For Hypertension, unspecified type    Vistaril 50 mg oral capsule  -- 1 cap(s) by mouth 4 times a day  -- Indication: For Anxiety    Adderall XR 20 mg oral capsule, extended release  -- 1 cap(s) by mouth once a day (in the morning)  -- Indication: For Anxiety    Adderall XR 10 mg oral capsule, extended release  -- 1 cap(s) by mouth once a day at noon  -- Indication: For Anxiety    torsemide  -- Indication: For Hypertension, unspecified type    hydroCHLOROthiazide 25 mg oral tablet  -- 1 tab(s) by mouth once a day  -- Indication: For Hypertension, unspecified type    sulfamethoxazole-trimethoprim 800 mg-160 mg oral tablet  -- 1 tab(s) by mouth every 12 hours  -- Indication: For UTI (urinary tract infection)    Zanaflex 4 mg oral tablet  -- 2 tab(s) by mouth every 8 hours  -- Indication: For Chronic pain I will START or STAY ON the medications listed below when I get home from the hospital:    oxyCODONE 20 mg oral tablet, extended release  -- 1 tab(s) by mouth once a day  -- Indication: For Chronic pain    oxyCODONE 10 mg oral tablet  -- 1 tab(s) by mouth 5 times a day, As needed, Severe Pain (7 - 10)  -- Indication: For Chronic pain    KlonoPIN 0.5 mg oral tablet  -- 0.5 milligram(s) by mouth once a day (at bedtime)  -- Indication: For Anxiety    KlonoPIN 0.25 mg oral tablet  -- 0.25 milligram(s) by mouth once a day  -- Indication: For Anxiety    KlonoPIN 0.25 mg oral tablet  -- 0.25 milligram(s) by mouth once a day, As Needed  -- Indication: For Anxiety    Diovan  mg-25 mg oral tablet  -- 1 tab(s) by mouth once a day  -- Indication: For Hypertension, unspecified type    Vistaril 50 mg oral capsule  -- 1 cap(s) by mouth 4 times a day  -- Indication: For Anxiety    Adderall XR 20 mg oral capsule, extended release  -- 1 cap(s) by mouth once a day (in the morning)  -- Indication: For Anxiety    Adderall XR 10 mg oral capsule, extended release  -- 1 cap(s) by mouth once a day at noon  -- Indication: For Anxiety    torsemide  -- Indication: For Hypertension, unspecified type    sulfamethoxazole-trimethoprim 800 mg-160 mg oral tablet  -- 1 tab(s) by mouth every 12 hours  -- Indication: For UTI (urinary tract infection)    Zanaflex 4 mg oral tablet  -- 2 tab(s) by mouth every 8 hours  -- Indication: For Chronic pain

## 2017-10-02 NOTE — PROGRESS NOTE ADULT - SUBJECTIVE AND OBJECTIVE BOX
OVERNIGHT EVENTS:  No acute overnight events.    SUBJECTIVE / INTERVAL HPI: Patient seen and examined at bedside. Today, pt endorses a HA starting since last night as well as dysuria, foul smelling urine, burning during urination, increased urinary frequency and a feeling of incomplete voiding. She also notes some suprapubic tenderness. Pt believes she is going through opioid withdrawal due to feeling nauseous and generalized myalgias but denies rhinorrhea, lacrimation, chest pain, SOB, diaphoresis, vomiting, abdominal cramping and diarrhea.      VITAL SIGNS:  Vital Signs Last 24 Hrs  T(C): 36.6 (02 Oct 2017 08:45), Max: 37.5 (01 Oct 2017 15:43)  T(F): 97.9 (02 Oct 2017 08:45), Max: 99.5 (01 Oct 2017 15:43)  HR: 78 (02 Oct 2017 08:45) (69 - 98)  BP: 124/77 (02 Oct 2017 08:45) (105/69 - 138/92)  BP(mean): 81 (01 Oct 2017 13:10) (81 - 81)  RR: 19 (02 Oct 2017 08:45) (17 - 19)  SpO2: 95% (02 Oct 2017 08:45) (95% - 99%)    PHYSICAL EXAM:    General: Well appearing, in some distress due to headache   HEENT: NC/AT; PERRL, anicteric sclera; MMM; no rhinorrhea or lacrimation   Neck: supple  Cardiovascular: +S1/S2, RRR  Respiratory: CTA B/L; no W/R/R  Gastrointestinal: soft, nondistended; normoactive BS; Intrathecal pump in RLQ (nonerythematous)  some tenderness in the RLQ and suprapubic region   Extremities: WWP; no edema, clubbing or cyanosis  Vascular: 2+ radial, DP/PT pulses B/L  Neurological: AAOx3; no focal deficits    MEDICATIONS:  MEDICATIONS  (STANDING):  clonazePAM Tablet 0.25 milliGRAM(s) Oral daily  clonazePAM Tablet 0.5 milliGRAM(s) Oral at bedtime  valsartan 320 milliGRAM(s) Oral daily  hydrochlorothiazide 25 milliGRAM(s) Oral daily  hydrOXYzine hydrochloride 50 milliGRAM(s) Oral every 8 hours  amphetamine/dextroamphetamine 10 milliGRAM(s) Oral daily  amphetamine/dextroamphetamine 20 milliGRAM(s) Oral daily  potassium chloride    Tablet ER 20 milliEquivalent(s) Oral daily  magnesium oxide 400 milliGRAM(s) Oral three times a day with meals  bisacodyl Suppository 20 milliGRAM(s) Rectal daily  heparin  Injectable 5000 Unit(s) SubCutaneous every 8 hours  oxyCODONE  ER Tablet 20 milliGRAM(s) Oral daily  polyethylene glycol 3350 17 Gram(s) Oral daily    MEDICATIONS  (PRN):  clonazePAM Tablet 0.25 milliGRAM(s) Oral daily PRN Anxiety  calcium carbonate 500 mG (Tums) Chewable 2 Tablet(s) Chew daily PRN Heartburn  oxyCODONE    IR 10 milliGRAM(s) Oral five times a day PRN Severe Pain (7 - 10)      ALLERGIES:  Allergies    Depakote (Anaphylaxis)  Haldol (Anaphylaxis)  Invega (Anaphylaxis)  Paxil (Anaphylaxis)  Steroids=Psychosis (Other)  Thorazine (Anaphylaxis)    Intolerances        LABS:                        12.3   6.6   )-----------( 198      ( 02 Oct 2017 07:50 )             37.4     10-02    138  |  102  |  13  ----------------------------<  97  4.5   |  24  |  0.73    Ca    9.6      02 Oct 2017 07:50  Phos  2.4     10-01  Mg     2.2     10-02 OVERNIGHT EVENTS:  No acute overnight events.    SUBJECTIVE / INTERVAL HPI: Patient seen and examined at bedside. Today, pt endorses a left-sided HA starting since last night consistent with headaches she had in the past. Pt also complains of dysuria, foul smelling urine, burning during urination, increased urinary frequency and a feeling of incomplete voiding. She also notes some suprapubic tenderness. Pt believes she is going through opioid withdrawal due to feeling nauseous and generalized myalgias but denies fever, chills, rhinorrhea, lacrimation, chest pain, SOB, diaphoresis, vomiting, abdominal cramping, diarrhea, hematuria.      VITAL SIGNS:  Vital Signs Last 24 Hrs  T(C): 36.6 (02 Oct 2017 08:45), Max: 37.5 (01 Oct 2017 15:43)  T(F): 97.9 (02 Oct 2017 08:45), Max: 99.5 (01 Oct 2017 15:43)  HR: 78 (02 Oct 2017 08:45) (69 - 98)  BP: 124/77 (02 Oct 2017 08:45) (105/69 - 138/92)  BP(mean): 81 (01 Oct 2017 13:10) (81 - 81)  RR: 19 (02 Oct 2017 08:45) (17 - 19)  SpO2: 95% (02 Oct 2017 08:45) (95% - 99%)    PHYSICAL EXAM:    General: Well appearing, in some distress due to headache   HEENT: NC/AT; PERRL, anicteric sclera; MMM; no rhinorrhea or lacrimation   Neck: supple  Cardiovascular: +S1/S2, RRR  Respiratory: CTA B/L; no W/R/R  Gastrointestinal: soft, nondistended; normoactive BS; Intrathecal pump in RLQ (nonerythematous)  some tenderness in the RLQ and suprapubic region   Extremities: WWP; no edema, clubbing or cyanosis  Vascular: 2+ radial, DP/PT pulses B/L  Neurological: AAOx3; no focal deficits    MEDICATIONS:  MEDICATIONS  (STANDING):  clonazePAM Tablet 0.25 milliGRAM(s) Oral daily  clonazePAM Tablet 0.5 milliGRAM(s) Oral at bedtime  valsartan 320 milliGRAM(s) Oral daily  hydrochlorothiazide 25 milliGRAM(s) Oral daily  hydrOXYzine hydrochloride 50 milliGRAM(s) Oral every 8 hours  amphetamine/dextroamphetamine 10 milliGRAM(s) Oral daily  amphetamine/dextroamphetamine 20 milliGRAM(s) Oral daily  potassium chloride    Tablet ER 20 milliEquivalent(s) Oral daily  magnesium oxide 400 milliGRAM(s) Oral three times a day with meals  bisacodyl Suppository 20 milliGRAM(s) Rectal daily  heparin  Injectable 5000 Unit(s) SubCutaneous every 8 hours  oxyCODONE  ER Tablet 20 milliGRAM(s) Oral daily  polyethylene glycol 3350 17 Gram(s) Oral daily    MEDICATIONS  (PRN):  clonazePAM Tablet 0.25 milliGRAM(s) Oral daily PRN Anxiety  calcium carbonate 500 mG (Tums) Chewable 2 Tablet(s) Chew daily PRN Heartburn  oxyCODONE    IR 10 milliGRAM(s) Oral five times a day PRN Severe Pain (7 - 10)      ALLERGIES:  Allergies    Depakote (Anaphylaxis)  Haldol (Anaphylaxis)  Invega (Anaphylaxis)  Paxil (Anaphylaxis)  Steroids=Psychosis (Other)  Thorazine (Anaphylaxis)    Intolerances        LABS:                        12.3   6.6   )-----------( 198      ( 02 Oct 2017 07:50 )             37.4     10-02    138  |  102  |  13  ----------------------------<  97  4.5   |  24  |  0.73    Ca    9.6      02 Oct 2017 07:50  Phos  2.4     10-01  Mg     2.2     10-02

## 2017-10-02 NOTE — PROGRESS NOTE ADULT - SUBJECTIVE AND OBJECTIVE BOX
Pt seen and examined feels better but c/o dysuria    REVIEW OF SYSTEMS:  Constitutional: No fever, weight loss or fatigue  Cardiovascular: No chest pain, palpitations, dizziness or leg swelling  Gastrointestinal: No abdominal or epigastric pain. No nausea, vomiting or hematemesis; No diarrhea or constipation. No melena or hematochezia.  Skin: No itching, burning, rashes or lesions       MEDICATIONS:  MEDICATIONS  (STANDING):  clonazePAM Tablet 0.25 milliGRAM(s) Oral daily  clonazePAM Tablet 0.5 milliGRAM(s) Oral at bedtime  valsartan 320 milliGRAM(s) Oral daily  hydrochlorothiazide 25 milliGRAM(s) Oral daily  hydrOXYzine hydrochloride 50 milliGRAM(s) Oral every 8 hours  amphetamine/dextroamphetamine 10 milliGRAM(s) Oral daily  amphetamine/dextroamphetamine 20 milliGRAM(s) Oral daily  potassium chloride    Tablet ER 20 milliEquivalent(s) Oral daily  magnesium oxide 400 milliGRAM(s) Oral three times a day with meals  bisacodyl Suppository 20 milliGRAM(s) Rectal daily  heparin  Injectable 5000 Unit(s) SubCutaneous every 8 hours  oxyCODONE  ER Tablet 20 milliGRAM(s) Oral daily  polyethylene glycol 3350 17 Gram(s) Oral daily    MEDICATIONS  (PRN):  clonazePAM Tablet 0.25 milliGRAM(s) Oral daily PRN Anxiety  calcium carbonate 500 mG (Tums) Chewable 2 Tablet(s) Chew daily PRN Heartburn  oxyCODONE    IR 10 milliGRAM(s) Oral five times a day PRN Severe Pain (7 - 10)      Allergies    Depakote (Anaphylaxis)  Haldol (Anaphylaxis)  Invega (Anaphylaxis)  Paxil (Anaphylaxis)  Steroids=Psychosis (Other)  Thorazine (Anaphylaxis)    Intolerances        Vital Signs Last 24 Hrs  T(C): 36.6 (02 Oct 2017 08:45), Max: 37.5 (01 Oct 2017 15:43)  T(F): 97.9 (02 Oct 2017 08:45), Max: 99.5 (01 Oct 2017 15:43)  HR: 78 (02 Oct 2017 08:45) (69 - 98)  BP: 124/77 (02 Oct 2017 08:45) (105/69 - 138/92)  BP(mean): 81 (01 Oct 2017 13:10) (81 - 81)  RR: 19 (02 Oct 2017 08:45) (17 - 19)  SpO2: 95% (02 Oct 2017 08:45) (95% - 99%)    10-01 @ 07:01  -  10-02 @ 07:00  --------------------------------------------------------  IN: 0 mL / OUT: 300 mL / NET: -300 mL        PHYSICAL EXAM:    General: Well developed; well nourished; in no acute distress  HEENT: MMM, conjunctiva and sclera clear  Lungs: clear  Heart: regular  Gastrointestinal: Soft non-tender non-distended; Normal bowel sounds; No hepatosplenomegaly  Skin: Warm and dry. No obvious rash    LABS:      CBC Full  -  ( 02 Oct 2017 07:50 )  WBC Count : 6.6 K/uL  Hemoglobin : 12.3 g/dL  Hematocrit : 37.4 %  Platelet Count - Automated : 198 K/uL  Mean Cell Volume : 80.3 fL  Mean Cell Hemoglobin : 26.4 pg  Mean Cell Hemoglobin Concentration : 32.9 g/dL  Auto Neutrophil # : x  Auto Lymphocyte # : x  Auto Monocyte # : x  Auto Eosinophil # : x  Auto Basophil # : x  Auto Neutrophil % : x  Auto Lymphocyte % : x  Auto Monocyte % : x  Auto Eosinophil % : x  Auto Basophil % : x    10-02    138  |  102  |  13  ----------------------------<  97  4.5   |  24  |  0.73    Ca    9.6      02 Oct 2017 07:50  Phos  2.4     10-01  Mg     2.2     10-02        Urinalysis (09.28.17 @ 21:17)    pH Urine: 7.5    Glucose Qualitative, Urine: NEGATIVE    Blood, Urine: NEGATIVE    Color: Yellow    Urine Appearance: Clear    Bilirubin: NEGATIVE    Ketone - Urine: NEGATIVE    Specific Gravity: 1.010    Protein, Urine: NEGATIVE mg/dL    Urobilinogen: 0.2 E.U./dL    Nitrite: NEGATIVE    Leukocyte Esterase Concentration: NEGATIVE                    RADIOLOGY & ADDITIONAL STUDIES (The following images were personally reviewed):

## 2017-10-02 NOTE — DISCHARGE NOTE ADULT - CARE PROVIDERS DIRECT ADDRESSES
[email or web address]

## 2017-10-02 NOTE — PROGRESS NOTE ADULT - PROBLEM SELECTOR PLAN 1
- As per private main management doctor (Dr. James), pt will remain overnight for monitoring s/p uptitration of intrathecal pump  - As per outpatient physician, oxycodone 10mg PO as breakthrough if needed, however hold if patient appears overly sedated.  - As per patient's private pain management doctor (Dr James), patient on Oxycontin 20 mg q12h, Oxycodone IR 10 mg q6h PRN and intrathecal pump

## 2017-10-02 NOTE — PROGRESS NOTE ADULT - PROBLEM SELECTOR PLAN 2
UTI vs urethral spasm s/p catheter removal on 9/30 vs neurogenic bladder    - await UA  - If UA demonstrates (+) leukocyte esterase and nitrates, treat for UTI (ciprofloxacin 500 mg PO) UTI vs urethral spasm s/p catheter removal on 9/30 vs neurogenic bladder    - await UA  - If UA demonstrates (+) WBCs and bacteria, start antibiotics  - If UA negative, symptomatic treatment

## 2017-10-02 NOTE — PROGRESS NOTE ADULT - SUBJECTIVE AND OBJECTIVE BOX
Neurology Follow up note    Name  JAMES MONTES    HPI:  Patient is a 58 yo F with PMHx of HTN, depression, anxiety, chronic pain with multiple past surgeries and intrathecal pump who presents from [facility] Pain management due to empty thecal pump for unknown number of days. Patient states that she has not had increased need but missed a refill by two weeks, and is not sure when she ran out. The patient has been having palpitations, sweating, and feeling 'not like herself' for the past four to seven days. She otherwise feels at baseline, feels a lot better now that her pump is refilled and has not needed any further increases in opioid medications. Patient denies fevers, chills, dizziness, changes in vision, fainting, chest pain, SOB, N/V/D. Last BM was two days ago, requesting dulcolax suppository for tonight and if it doesnt work then enema. Patient currently with HA on right side similar to past cluster headaches. (28 Sep 2017 22:25)      Interval History -chronic low back pain- no new weakness in UE/ LE        REVIEW OF SYSTEMS    Vital Signs Last 24 Hrs  T(C): 36.6 (02 Oct 2017 08:45), Max: 37.5 (01 Oct 2017 15:43)  T(F): 97.9 (02 Oct 2017 08:45), Max: 99.5 (01 Oct 2017 15:43)  HR: 78 (02 Oct 2017 08:45) (69 - 98)  BP: 124/77 (02 Oct 2017 08:45) (105/69 - 138/92)  BP(mean): 81 (01 Oct 2017 13:10) (81 - 81)  RR: 19 (02 Oct 2017 08:45) (17 - 19)  SpO2: 95% (02 Oct 2017 08:45) (95% - 99%)    Physical Exam- awake    Mental Status- no aphasia    Cranial Nerves- full EOM    Gait and station-n/a    Motor- moves all 4 extremities    Reflexes- decreased    Sensation- no sensory level    Coordination- no tremors    Vascular -    Medications  clonazePAM Tablet 0.25 milliGRAM(s) Oral daily  clonazePAM Tablet 0.25 milliGRAM(s) Oral daily PRN  clonazePAM Tablet 0.5 milliGRAM(s) Oral at bedtime  valsartan 320 milliGRAM(s) Oral daily  hydrochlorothiazide 25 milliGRAM(s) Oral daily  hydrOXYzine hydrochloride 50 milliGRAM(s) Oral every 8 hours  amphetamine/dextroamphetamine 10 milliGRAM(s) Oral daily  amphetamine/dextroamphetamine 20 milliGRAM(s) Oral daily  potassium chloride    Tablet ER 20 milliEquivalent(s) Oral daily  magnesium oxide 400 milliGRAM(s) Oral three times a day with meals  bisacodyl Suppository 20 milliGRAM(s) Rectal daily  heparin  Injectable 5000 Unit(s) SubCutaneous every 8 hours  oxyCODONE  ER Tablet 20 milliGRAM(s) Oral daily  polyethylene glycol 3350 17 Gram(s) Oral daily  calcium carbonate 500 mG (Tums) Chewable 2 Tablet(s) Chew daily PRN  oxyCODONE    IR 10 milliGRAM(s) Oral five times a day PRN      Lab      Radiology    Assessment- Chronic pain    Plan as per primary team   Obtain CT lumbar- no contrast   Sed rate, CRP

## 2017-10-02 NOTE — PROGRESS NOTE ADULT - PROBLEM SELECTOR PLAN 4
Currently well controlled on current home meds  - continue home medications and home Klonopin regimen as currently ordered  - no s/sx respiratory compromise on multiple sedating meds, klonopin dosing.

## 2017-10-02 NOTE — PROGRESS NOTE ADULT - PROBLEM SELECTOR PLAN 3
Patient was without opioids for unknown number of days, now with morphine in thecal pump s/p refill  - continue monitoring for s/sx opiate w/d (none so far reported)  - Following Dr. James (pain mgmt) recs and all intrathecal pump mgmt via pain mgmt   - per recommendations will call Dr. James if RR <10.

## 2017-10-02 NOTE — DISCHARGE NOTE ADULT - CARE PROVIDER_API CALL
Juan Marcelino), Medicine  [address] [facility] [address]  [place] [address]  Phone: [phone]  Fax: [phone]

## 2017-10-03 VITALS — WEIGHT: 129.85 LBS

## 2017-10-03 DIAGNOSIS — N39.0 URINARY TRACT INFECTION, SITE NOT SPECIFIED: ICD-10-CM

## 2017-10-03 DIAGNOSIS — R07.9 CHEST PAIN, UNSPECIFIED: ICD-10-CM

## 2017-10-03 DIAGNOSIS — N30.00 ACUTE CYSTITIS WITHOUT HEMATURIA: ICD-10-CM

## 2017-10-03 LAB
ANION GAP SERPL CALC-SCNC: 12 MMOL/L — SIGNIFICANT CHANGE UP (ref 5–17)
BUN SERPL-MCNC: 14 MG/DL — SIGNIFICANT CHANGE UP (ref 7–23)
CALCIUM SERPL-MCNC: 9.8 MG/DL — SIGNIFICANT CHANGE UP (ref 8.4–10.5)
CHLORIDE SERPL-SCNC: 98 MMOL/L — SIGNIFICANT CHANGE UP (ref 96–108)
CO2 SERPL-SCNC: 27 MMOL/L — SIGNIFICANT CHANGE UP (ref 22–31)
CREAT SERPL-MCNC: 1 MG/DL — SIGNIFICANT CHANGE UP (ref 0.5–1.3)
GLUCOSE SERPL-MCNC: 76 MG/DL — SIGNIFICANT CHANGE UP (ref 70–99)
MAGNESIUM SERPL-MCNC: 2.6 MG/DL — SIGNIFICANT CHANGE UP (ref 1.6–2.6)
PHOSPHATE SERPL-MCNC: 4.8 MG/DL — HIGH (ref 2.5–4.5)
POTASSIUM SERPL-MCNC: 5 MMOL/L — SIGNIFICANT CHANGE UP (ref 3.5–5.3)
POTASSIUM SERPL-SCNC: 5 MMOL/L — SIGNIFICANT CHANGE UP (ref 3.5–5.3)
SODIUM SERPL-SCNC: 137 MMOL/L — SIGNIFICANT CHANGE UP (ref 135–145)

## 2017-10-03 PROCEDURE — 97161 PT EVAL LOW COMPLEX 20 MIN: CPT

## 2017-10-03 PROCEDURE — 84100 ASSAY OF PHOSPHORUS: CPT

## 2017-10-03 PROCEDURE — 80053 COMPREHEN METABOLIC PANEL: CPT

## 2017-10-03 PROCEDURE — 87186 SC STD MICRODIL/AGAR DIL: CPT

## 2017-10-03 PROCEDURE — 81001 URINALYSIS AUTO W/SCOPE: CPT

## 2017-10-03 PROCEDURE — 93005 ELECTROCARDIOGRAM TRACING: CPT

## 2017-10-03 PROCEDURE — 83735 ASSAY OF MAGNESIUM: CPT

## 2017-10-03 PROCEDURE — 83690 ASSAY OF LIPASE: CPT

## 2017-10-03 PROCEDURE — 85610 PROTHROMBIN TIME: CPT

## 2017-10-03 PROCEDURE — 85730 THROMBOPLASTIN TIME PARTIAL: CPT

## 2017-10-03 PROCEDURE — 36415 COLL VENOUS BLD VENIPUNCTURE: CPT

## 2017-10-03 PROCEDURE — 85025 COMPLETE CBC W/AUTO DIFF WBC: CPT

## 2017-10-03 PROCEDURE — 85027 COMPLETE CBC AUTOMATED: CPT

## 2017-10-03 PROCEDURE — 99285 EMERGENCY DEPT VISIT HI MDM: CPT | Mod: 25

## 2017-10-03 PROCEDURE — 93010 ELECTROCARDIOGRAM REPORT: CPT

## 2017-10-03 PROCEDURE — 90686 IIV4 VACC NO PRSV 0.5 ML IM: CPT

## 2017-10-03 PROCEDURE — 80048 BASIC METABOLIC PNL TOTAL CA: CPT

## 2017-10-03 PROCEDURE — 87086 URINE CULTURE/COLONY COUNT: CPT

## 2017-10-03 PROCEDURE — 81003 URINALYSIS AUTO W/O SCOPE: CPT

## 2017-10-03 RX ORDER — OXYCODONE HYDROCHLORIDE 5 MG/1
1 TABLET ORAL
Qty: 0 | Refills: 0 | COMMUNITY
Start: 2017-10-03

## 2017-10-03 RX ORDER — HYDROCORTISONE 1 %
1 OINTMENT (GRAM) TOPICAL
Qty: 0 | Refills: 0 | Status: DISCONTINUED | OUTPATIENT
Start: 2017-10-03 | End: 2017-10-03

## 2017-10-03 RX ORDER — DIPHENHYDRAMINE HCL/ZINC ACET 2 %-0.1 %
1 CREAM (GRAM) TOPICAL DAILY
Qty: 0 | Refills: 0 | Status: DISCONTINUED | OUTPATIENT
Start: 2017-10-03 | End: 2017-10-03

## 2017-10-03 RX ORDER — ACETAMINOPHEN 500 MG
650 TABLET ORAL EVERY 6 HOURS
Qty: 0 | Refills: 0 | Status: DISCONTINUED | OUTPATIENT
Start: 2017-10-03 | End: 2017-10-03

## 2017-10-03 RX ADMIN — OXYCODONE HYDROCHLORIDE 10 MILLIGRAM(S): 5 TABLET ORAL at 07:11

## 2017-10-03 RX ADMIN — OXYCODONE HYDROCHLORIDE 10 MILLIGRAM(S): 5 TABLET ORAL at 01:16

## 2017-10-03 RX ADMIN — POLYETHYLENE GLYCOL 3350 17 GRAM(S): 17 POWDER, FOR SOLUTION ORAL at 12:51

## 2017-10-03 RX ADMIN — OXYCODONE HYDROCHLORIDE 10 MILLIGRAM(S): 5 TABLET ORAL at 00:54

## 2017-10-03 RX ADMIN — VALSARTAN 320 MILLIGRAM(S): 80 TABLET ORAL at 06:29

## 2017-10-03 RX ADMIN — Medication 0.25 MILLIGRAM(S): at 12:51

## 2017-10-03 RX ADMIN — Medication 1 TABLET(S): at 06:29

## 2017-10-03 RX ADMIN — MAGNESIUM OXIDE 400 MG ORAL TABLET 400 MILLIGRAM(S): 241.3 TABLET ORAL at 09:24

## 2017-10-03 RX ADMIN — Medication 50 MILLIGRAM(S): at 00:55

## 2017-10-03 RX ADMIN — Medication 25 MILLIGRAM(S): at 06:29

## 2017-10-03 RX ADMIN — OXYCODONE HYDROCHLORIDE 20 MILLIGRAM(S): 5 TABLET ORAL at 12:51

## 2017-10-03 RX ADMIN — Medication 50 MILLIGRAM(S): at 09:24

## 2017-10-03 RX ADMIN — Medication 1 APPLICATION(S): at 12:51

## 2017-10-03 RX ADMIN — DEXTROAMPHETAMINE SACCHARATE, AMPHETAMINE ASPARTATE, DEXTROAMPHETAMINE SULFATE AND AMPHETAMINE SULFATE 10 MILLIGRAM(S): 1.875; 1.875; 1.875; 1.875 TABLET ORAL at 12:50

## 2017-10-03 RX ADMIN — Medication 100 MILLIGRAM(S): at 06:29

## 2017-10-03 RX ADMIN — HEPARIN SODIUM 5000 UNIT(S): 5000 INJECTION INTRAVENOUS; SUBCUTANEOUS at 06:29

## 2017-10-03 RX ADMIN — MAGNESIUM OXIDE 400 MG ORAL TABLET 400 MILLIGRAM(S): 241.3 TABLET ORAL at 12:50

## 2017-10-03 RX ADMIN — OXYCODONE HYDROCHLORIDE 20 MILLIGRAM(S): 5 TABLET ORAL at 12:56

## 2017-10-03 NOTE — DIETITIAN INITIAL EVALUATION ADULT. - NS AS NUTRI INTERV ED CONTENT
dash tlc  education/Purpose of the nutrition education/Nutrition relationship to health/disease/Survival information

## 2017-10-03 NOTE — PROGRESS NOTE ADULT - PROVIDER SPECIALTY LIST ADULT
Internal Medicine
Neurology
Neurology

## 2017-10-03 NOTE — PROGRESS NOTE ADULT - PROBLEM SELECTOR PLAN 4
Adult ADHD  - mixed amphetamine salts (Adderall) 20mg PO daily @ 0800hrs and 10mg PO daily @ 1200hrs.

## 2017-10-03 NOTE — PROGRESS NOTE ADULT - PROBLEM SELECTOR PLAN 5
DASH/TLC diet  Repletes lytes to keep K>4 and Mg>2
cured s/p Harvoni
Patient does not know full list (torsemide dose not known)  - continue to monitor for HTN or Hypotension and any other signs of opioid withdrawal  - valsartan and HCTZ ordered at known home doses
Patient does not know full list (torsemide dose not known)  - continue to monitor for HTN or Hypotension and any other signs of opioid withdrawal  - valsartan and HCTZ ordered at known home doses.
Patient does not know full list (torsemide dose not known), continuing HCTZ and valsartan of known doses  - continue to monitor for HTN or Hypotension and any other signs of opioid withdrawal. If patient becomes HD unstable, consider ICU consult for possible step-up.
Patient does not know full list (torsemide dose not known), continuing all other medications of known doses  - Will continue to monitor for HTN or Hypotension and any other signs of opioid withdrawal. If patient becomes HD unstable, consider ICU consult for possible step-up.
Patient does not know full list (torsemide dose not known), continuing HCTZ and valsartan of known doses  - Will continue to monitor for HTN or Hypotension and any other signs of opioid withdrawal. If patient becomes HD unstable, consider ICU consult for possible step-up.
Adult ADHD  - mixed amphetamine salts (Adderall) 20mg PO daily @ 0800hrs and 10mg PO daily @ 1200hrs.

## 2017-10-03 NOTE — PROGRESS NOTE ADULT - ASSESSMENT
58 yo F with PMH of HTN, depression, anxiety, chronic pain due to multiple past surgeries and intrathecal pump admitted from [facility] pain management due to empty thecal pump for an unknown amount of time.

## 2017-10-03 NOTE — PROGRESS NOTE ADULT - PROBLEM SELECTOR PLAN 7
DVT PPx: HSQ    CODE: FULL.    DISPO: Medically stable and ready for discharge to [facility]

## 2017-10-03 NOTE — PROGRESS NOTE ADULT - PROBLEM SELECTOR PLAN 1
Pt reports improvement s/p Dr. James increasing rate of intrathecal pump  - c/w Oxycontin 20 mg q12h, Oxycodone IR 10 mg q6h PRN and intrathecal pump  - to alert Dr. James if respiratory depression, RR <10

## 2017-10-03 NOTE — PROGRESS NOTE ADULT - SUBJECTIVE AND OBJECTIVE BOX
Pt seen and examined  C/O chest pain.  dysuria better    REVIEW OF SYSTEMS:  Constitutional: No fever, weight loss or fatigue  Cardiovascular: ? chest pain, palpitations, dizziness or leg swelling  Gastrointestinal: No abdominal or epigastric pain. No nausea, vomiting or hematemesis; No diarrhea or constipation. No melena or hematochezia.  Skin: No itching, burning, rashes or lesions       MEDICATIONS:  MEDICATIONS  (STANDING):  amphetamine/dextroamphetamine 10 milliGRAM(s) Oral daily  amphetamine/dextroamphetamine 20 milliGRAM(s) Oral daily  BACItracin   Ointment 1 Application(s) Topical three times a day  bisacodyl Suppository 20 milliGRAM(s) Rectal daily  clonazePAM Tablet 0.25 milliGRAM(s) Oral daily  clonazePAM Tablet 0.5 milliGRAM(s) Oral at bedtime  diphenhydrAMINE 2%/zinc acetate 0.1% Cream 1 Application(s) Topical daily  heparin  Injectable 5000 Unit(s) SubCutaneous every 8 hours  hydrochlorothiazide 25 milliGRAM(s) Oral daily  hydrocortisone 2.5% Cream 1 Application(s) Topical two times a day  hydrOXYzine hydrochloride 50 milliGRAM(s) Oral every 8 hours  magnesium oxide 400 milliGRAM(s) Oral three times a day with meals  oxyCODONE  ER Tablet 20 milliGRAM(s) Oral daily  phenazopyridine 100 milliGRAM(s) Oral every 8 hours  polyethylene glycol 3350 17 Gram(s) Oral daily  trimethoprim  160 mG/sulfamethoxazole 800 mG 1 Tablet(s) Oral every 12 hours  valsartan 320 milliGRAM(s) Oral daily    MEDICATIONS  (PRN):  acetaminophen   Tablet. 650 milliGRAM(s) Oral every 6 hours PRN Moderate Pain (4 - 6)  calcium carbonate 500 mG (Tums) Chewable 2 Tablet(s) Chew daily PRN Heartburn  clonazePAM Tablet 0.25 milliGRAM(s) Oral daily PRN Anxiety  hydrocortisone 1% Ointment 1 Application(s) Topical three times a day PRN Itching  oxyCODONE    IR 10 milliGRAM(s) Oral five times a day PRN Severe Pain (7 - 10)      Allergies    Depakote (Anaphylaxis)  Haldol (Anaphylaxis)  Invega (Anaphylaxis)  Paxil (Anaphylaxis)  Steroids=Psychosis (Other)  Thorazine (Anaphylaxis)    Intolerances        Vital Signs Last 24 Hrs  T(C): 36.6 (03 Oct 2017 07:00), Max: 37.2 (02 Oct 2017 16:54)  T(F): 97.9 (03 Oct 2017 07:00), Max: 98.9 (02 Oct 2017 16:54)  HR: 78 (03 Oct 2017 07:00) (78 - 97)  BP: 106/57 (03 Oct 2017 07:00) (98/65 - 113/73)  BP(mean): --  RR: 19 (03 Oct 2017 07:00) (17 - 19)  SpO2: 97% (03 Oct 2017 07:00) (95% - 98%)      PHYSICAL EXAM:    General: kyphotic; in no acute distress  HEENT: MMM, conjunctiva and sclera clear  Lungs: clear  Heart: regular  Gastrointestinal: Soft non-tender non-distended; Normal bowel sounds; No hepatosplenomegaly  Skin: Warm and dry. No obvious rash    LABS:      CBC Full  -  ( 02 Oct 2017 07:50 )  WBC Count : 6.6 K/uL  Hemoglobin : 12.3 g/dL  Hematocrit : 37.4 %  Platelet Count - Automated : 198 K/uL  Mean Cell Volume : 80.3 fL  Mean Cell Hemoglobin : 26.4 pg  Mean Cell Hemoglobin Concentration : 32.9 g/dL  Auto Neutrophil # : x  Auto Lymphocyte # : x  Auto Monocyte # : x  Auto Eosinophil # : x  Auto Basophil # : x  Auto Neutrophil % : x  Auto Lymphocyte % : x  Auto Monocyte % : x  Auto Eosinophil % : x  Auto Basophil % : x    10-03    137  |  98  |  14  ----------------------------<  76  5.0   |  27  |  1.00    Ca    9.8      03 Oct 2017 07:08  Mg     2.2     10-02            Urinalysis Basic - ( 02 Oct 2017 12:09 )    Color: Yellow / Appearance: Clear / S.020 / pH: x  Gluc: x / Ketone: NEGATIVE  / Bili: NEGATIVE / Urobili: 0.2 E.U./dL   Blood: x / Protein: NEGATIVE mg/dL / Nitrite: POSITIVE   Leuk Esterase: Small / RBC: < 5 /HPF / WBC Many /HPF   Sq Epi: x / Non Sq Epi: Rare /HPF / Bacteria: Many /HPF                RADIOLOGY & ADDITIONAL STUDIES (The following images were personally reviewed):

## 2017-10-03 NOTE — PROGRESS NOTE ADULT - SUBJECTIVE AND OBJECTIVE BOX
OVERNIGHT EVENTS: Pt stated she thought itchy areas of her back were "due to scabies", ordered bacitracin and hydrocortisone cream overnight    SUBJECTIVE / INTERVAL HPI: Patient seen and examined at bedside. Reports improvement with increase of intrathecal pump. otherwise no complaints at this time.    VITAL SIGNS:  Vital Signs Last 24 Hrs  T(C): 36.6 (03 Oct 2017 07:00), Max: 37.2 (02 Oct 2017 16:54)  T(F): 97.9 (03 Oct 2017 07:00), Max: 98.9 (02 Oct 2017 16:54)  HR: 78 (03 Oct 2017 07:00) (78 - 97)  BP: 106/57 (03 Oct 2017 07:00) (98/65 - 124/77)  BP(mean): --  RR: 19 (03 Oct 2017 07:00) (17 - 19)  SpO2: 97% (03 Oct 2017 07:00) (95% - 98%)    PHYSICAL EXAM:    General: WDWN, comfortable, in NAD; able to sit up without pain  HEENT: NC/AT; PERRL, anicteric sclera; MMM  Neck: supple  Cardiovascular: +S1/S2; RRR  Respiratory: CTA B/L; no W/R/R  Gastrointestinal: soft, NT/ND; +BSx4  Extremities: WWP; no edema, clubbing or cyanosis  Vascular: 2+ radial, DP/PT pulses B/L      MEDICATIONS:  MEDICATIONS  (STANDING):  amphetamine/dextroamphetamine 10 milliGRAM(s) Oral daily  amphetamine/dextroamphetamine 20 milliGRAM(s) Oral daily  BACItracin   Ointment 1 Application(s) Topical three times a day  bisacodyl Suppository 20 milliGRAM(s) Rectal daily  clonazePAM Tablet 0.25 milliGRAM(s) Oral daily  clonazePAM Tablet 0.5 milliGRAM(s) Oral at bedtime  heparin  Injectable 5000 Unit(s) SubCutaneous every 8 hours  hydrochlorothiazide 25 milliGRAM(s) Oral daily  hydrOXYzine hydrochloride 50 milliGRAM(s) Oral every 8 hours  magnesium oxide 400 milliGRAM(s) Oral three times a day with meals  oxyCODONE  ER Tablet 20 milliGRAM(s) Oral daily  phenazopyridine 100 milliGRAM(s) Oral every 8 hours  polyethylene glycol 3350 17 Gram(s) Oral daily  trimethoprim  160 mG/sulfamethoxazole 800 mG 1 Tablet(s) Oral every 12 hours  valsartan 320 milliGRAM(s) Oral daily    MEDICATIONS  (PRN):  calcium carbonate 500 mG (Tums) Chewable 2 Tablet(s) Chew daily PRN Heartburn  clonazePAM Tablet 0.25 milliGRAM(s) Oral daily PRN Anxiety  hydrocortisone 1% Ointment 1 Application(s) Topical three times a day PRN Itching  oxyCODONE    IR 10 milliGRAM(s) Oral five times a day PRN Severe Pain (7 - 10)      ALLERGIES:  Allergies    Depakote (Anaphylaxis)  Haldol (Anaphylaxis)  Invega (Anaphylaxis)  Paxil (Anaphylaxis)  Steroids=Psychosis (Other)  Thorazine (Anaphylaxis)    Intolerances        LABS:                        12.3   6.6   )-----------( 198      ( 02 Oct 2017 07:50 )             37.4     10-03    137  |  98  |  14  ----------------------------<  76  5.0   |  27  |  1.00    Ca    9.8      03 Oct 2017 07:08  Mg     2.2     10-02        Urinalysis Basic - ( 02 Oct 2017 12:09 )    Color: Yellow / Appearance: Clear / S.020 / pH: x  Gluc: x / Ketone: NEGATIVE  / Bili: NEGATIVE / Urobili: 0.2 E.U./dL   Blood: x / Protein: NEGATIVE mg/dL / Nitrite: POSITIVE   Leuk Esterase: Small / RBC: < 5 /HPF / WBC Many /HPF   Sq Epi: x / Non Sq Epi: Rare /HPF / Bacteria: Many /HPF      CAPILLARY BLOOD GLUCOSE          RADIOLOGY & ADDITIONAL TESTS: Reviewed. OVERNIGHT EVENTS: Pt stated she thought itchy areas of her back were "due to scabies", ordered bacitracin and hydrocortisone cream overnight    SUBJECTIVE / INTERVAL HPI: Patient seen and examined at bedside. Reports improvement with increase of intrathecal pump. otherwise no complaints at this time; pt picking scabs from various area of body, back [id number] region of superficial erythema not indicative of scabies    VITAL SIGNS:  Vital Signs Last 24 Hrs  T(C): 36.6 (03 Oct 2017 07:00), Max: 37.2 (02 Oct 2017 16:54)  T(F): 97.9 (03 Oct 2017 07:00), Max: 98.9 (02 Oct 2017 16:54)  HR: 78 (03 Oct 2017 07:00) (78 - 97)  BP: 106/57 (03 Oct 2017 07:00) (98/65 - 124/77)  BP(mean): --  RR: 19 (03 Oct 2017 07:00) (17 - 19)  SpO2: 97% (03 Oct 2017 07:00) (95% - 98%)    PHYSICAL EXAM:    General: WDWN, comfortable, in NAD; able to sit up without pain  HEENT: NC/AT; PERRL, anicteric sclera; MMM  Neck: supple  Cardiovascular: +S1/S2; RRR  Respiratory: CTA B/L; no W/R/R  Gastrointestinal: soft, NT/ND; +BSx4  Extremities: WWP; no edema, clubbing or cyanosis  Vascular: 2+ radial, DP/PT pulses B/L      MEDICATIONS:  MEDICATIONS  (STANDING):  amphetamine/dextroamphetamine 10 milliGRAM(s) Oral daily  amphetamine/dextroamphetamine 20 milliGRAM(s) Oral daily  BACItracin   Ointment 1 Application(s) Topical three times a day  bisacodyl Suppository 20 milliGRAM(s) Rectal daily  clonazePAM Tablet 0.25 milliGRAM(s) Oral daily  clonazePAM Tablet 0.5 milliGRAM(s) Oral at bedtime  heparin  Injectable 5000 Unit(s) SubCutaneous every 8 hours  hydrochlorothiazide 25 milliGRAM(s) Oral daily  hydrOXYzine hydrochloride 50 milliGRAM(s) Oral every 8 hours  magnesium oxide 400 milliGRAM(s) Oral three times a day with meals  oxyCODONE  ER Tablet 20 milliGRAM(s) Oral daily  phenazopyridine 100 milliGRAM(s) Oral every 8 hours  polyethylene glycol 3350 17 Gram(s) Oral daily  trimethoprim  160 mG/sulfamethoxazole 800 mG 1 Tablet(s) Oral every 12 hours  valsartan 320 milliGRAM(s) Oral daily    MEDICATIONS  (PRN):  calcium carbonate 500 mG (Tums) Chewable 2 Tablet(s) Chew daily PRN Heartburn  clonazePAM Tablet 0.25 milliGRAM(s) Oral daily PRN Anxiety  hydrocortisone 1% Ointment 1 Application(s) Topical three times a day PRN Itching  oxyCODONE    IR 10 milliGRAM(s) Oral five times a day PRN Severe Pain (7 - 10)      ALLERGIES:  Allergies    Depakote (Anaphylaxis)  Haldol (Anaphylaxis)  Invega (Anaphylaxis)  Paxil (Anaphylaxis)  Steroids=Psychosis (Other)  Thorazine (Anaphylaxis)    Intolerances        LABS:                        12.3   6.6   )-----------( 198      ( 02 Oct 2017 07:50 )             37.4     10-03    137  |  98  |  14  ----------------------------<  76  5.0   |  27  |  1.00    Ca    9.8      03 Oct 2017 07:08  Mg     2.2     10-02        Urinalysis Basic - ( 02 Oct 2017 12:09 )    Color: Yellow / Appearance: Clear / S.020 / pH: x  Gluc: x / Ketone: NEGATIVE  / Bili: NEGATIVE / Urobili: 0.2 E.U./dL   Blood: x / Protein: NEGATIVE mg/dL / Nitrite: POSITIVE   Leuk Esterase: Small / RBC: < 5 /HPF / WBC Many /HPF   Sq Epi: x / Non Sq Epi: Rare /HPF / Bacteria: Many /HPF      CAPILLARY BLOOD GLUCOSE          RADIOLOGY & ADDITIONAL TESTS: Reviewed.

## 2017-10-03 NOTE — DIETITIAN INITIAL EVALUATION ADULT. - PROBLEM SELECTOR PLAN 1
Patient without opioids for unknown number of days, now with morphine in thecal pump  - monitor for withdrawal  - pain management as per specialist, f/u recs in AM  - tizanidine not available, f.u with pain management in AM for alternative, currently without spasm complaints

## 2017-10-03 NOTE — DIETITIAN INITIAL EVALUATION ADULT. - PROBLEM SELECTOR PLAN 6
Heparin 5000 units SQ Q8hrs  No indication for stress ulcer ppx    FULL CODE    DIspo: Admite to [facility]

## 2017-10-03 NOTE — PROGRESS NOTE ADULT - PROBLEM SELECTOR PROBLEM 6
Need for prophylactic measure
Nutrition, metabolism, and development symptoms
Hypertension, unspecified type

## 2017-10-03 NOTE — PROGRESS NOTE ADULT - PROBLEM SELECTOR PROBLEM 7
Need for prophylactic measure
Nutrition, metabolism, and development symptoms

## 2017-10-03 NOTE — PROGRESS NOTE ADULT - PROBLEM SELECTOR PROBLEM 5
Nutrition, metabolism, and development symptoms
History of hepatitis C
HTN (hypertension)
Hypertension, unspecified type
HTN (hypertension)
Attention deficit hyperactivity disorder (ADHD), other type

## 2017-10-03 NOTE — DIETITIAN INITIAL EVALUATION ADULT. - OTHER INFO
No food allergys , skin - intact - pain - chronic pain ,  anxiety ,  no n/v/d/c  , diet recall - no diet followed strictly pta

## 2017-10-03 NOTE — PROGRESS NOTE ADULT - PROBLEM SELECTOR PROBLEM 2
Pain management
Acute cystitis without hematuria
Chronic pain
R/O Opioid withdrawal
UTI (urinary tract infection)
R/O Opioid withdrawal
Dysuria

## 2017-10-03 NOTE — PROGRESS NOTE ADULT - PROBLEM SELECTOR PROBLEM 1
Chest pain, unspecified type
R/O Opioid withdrawal
Pain management
R/O Opioid withdrawal

## 2017-10-03 NOTE — PROGRESS NOTE ADULT - PROBLEM SELECTOR PROBLEM 4
HTN (hypertension)
GERD (gastroesophageal reflux disease)
History of hepatitis C
ADHD
ADHD
Attention deficit hyperactivity disorder (ADHD), other type
Attention deficit hyperactivity disorder (ADHD), other type
ADHD
Anxiety

## 2017-10-03 NOTE — PROGRESS NOTE ADULT - SUBJECTIVE AND OBJECTIVE BOX
Neurology Follow up note    Name  JAMES MONTES    HPI:  Patient is a 58 yo F with PMHx of HTN, depression, anxiety, chronic pain with multiple past surgeries and intrathecal pump who presents from [facility] due to empty thecal pump for unknown number of days. Patient states that she has not had increased need but missed a refill by two weeks, and is not sure when she ran out. The patient has been having palpitations, sweating, and feeling 'not like herself' for the past four to seven days. She otherwise feels at baseline, feels a lot better now that her pump is refilled and has not needed any further increases in opioid medications. Patient denies fevers, chills, dizziness, changes in vision, fainting, chest pain, SOB, N/V/D. Last BM was two days ago, requesting dulcolax suppository for tonight and if it doesnt work then enema. Patient currently with HA on right side similar to past cluster headaches. (28 Sep 2017 22:25)      Interval History - chronic pain        REVIEW OF SYSTEMS    Vital Signs Last 24 Hrs  T(C): 36.6 (03 Oct 2017 07:00), Max: 37.2 (02 Oct 2017 16:54)  T(F): 97.9 (03 Oct 2017 07:00), Max: 98.9 (02 Oct 2017 16:54)  HR: 78 (03 Oct 2017 07:00) (78 - 97)  BP: 106/57 (03 Oct 2017 07:00) (98/65 - 113/73)  BP(mean): --  RR: 19 (03 Oct 2017 07:00) (17 - 19)  SpO2: 97% (03 Oct 2017 07:00) (95% - 98%)    Physical Exam-     Mental Status-awake    Cranial Nerves- no diplopia    Gait and station-n/a    Motor- no foot drop    Reflexes- decreased    Sensation-    Coordination- no tremors    Vascular -    Medications  acetaminophen   Tablet. 650 milliGRAM(s) Oral every 6 hours PRN  amphetamine/dextroamphetamine 10 milliGRAM(s) Oral daily  amphetamine/dextroamphetamine 20 milliGRAM(s) Oral daily  BACItracin   Ointment 1 Application(s) Topical three times a day  bisacodyl Suppository 20 milliGRAM(s) Rectal daily  calcium carbonate 500 mG (Tums) Chewable 2 Tablet(s) Chew daily PRN  clonazePAM Tablet 0.25 milliGRAM(s) Oral daily  clonazePAM Tablet 0.25 milliGRAM(s) Oral daily PRN  clonazePAM Tablet 0.5 milliGRAM(s) Oral at bedtime  diphenhydrAMINE 2%/zinc acetate 0.1% Cream 1 Application(s) Topical daily  heparin  Injectable 5000 Unit(s) SubCutaneous every 8 hours  hydrochlorothiazide 25 milliGRAM(s) Oral daily  hydrocortisone 1% Ointment 1 Application(s) Topical three times a day PRN  hydrOXYzine hydrochloride 50 milliGRAM(s) Oral every 8 hours  magnesium oxide 400 milliGRAM(s) Oral three times a day with meals  oxyCODONE    IR 10 milliGRAM(s) Oral five times a day PRN  oxyCODONE  ER Tablet 20 milliGRAM(s) Oral daily  phenazopyridine 100 milliGRAM(s) Oral every 8 hours  polyethylene glycol 3350 17 Gram(s) Oral daily  trimethoprim  160 mG/sulfamethoxazole 800 mG 1 Tablet(s) Oral every 12 hours  valsartan 320 milliGRAM(s) Oral daily      Lab      Radiology    Assessment- Chronic pain    Plan- pain management fu

## 2017-10-03 NOTE — PROGRESS NOTE ADULT - PROBLEM SELECTOR PLAN 6
F: No IVF warranted at this time  E: Replete electrolytes as needed, K>4, Mg>2  N: DASH/TLC    DVT ppx: Heparin 5000 units SQ Q8hrs  No indication for stress ulcer ppx  FULL CODE  DIspo: 7Uris
.  F: no IVF indicated at current time  E: replete lytes PRN to maintain K>4, Mg>2  N: DASH/TLC diet
DASH/TLC diet  Repletes lytes to keep K>4 and Mg>2
F: No IVF warranted at this time  E: Replete electrolytes as needed, K>4, Mg>2  N: DASH/TLC
F: no IVF indicated at current time  E: replete lytes PRN to maintain K>4, Mg>2  N: DASH/TLC diet.
F: No IVF warranted at this time  E: Replete electrolytes as needed, K>4, Mg>2  N: DASH/TLC
Patient does not know full list (torsemide dose not known)  - continue to monitor for HTN or Hypotension and any other signs of opioid withdrawal  - valsartan and HCTZ ordered at known home doses.

## 2017-10-04 LAB
-  AMPICILLIN/SULBACTAM: SIGNIFICANT CHANGE UP
-  AMPICILLIN: SIGNIFICANT CHANGE UP
-  CEFAZOLIN: SIGNIFICANT CHANGE UP
-  CEFTRIAXONE: SIGNIFICANT CHANGE UP
-  CIPROFLOXACIN: SIGNIFICANT CHANGE UP
-  GENTAMICIN: SIGNIFICANT CHANGE UP
-  NITROFURANTOIN: SIGNIFICANT CHANGE UP
-  PIPERACILLIN/TAZOBACTAM: SIGNIFICANT CHANGE UP
-  TOBRAMYCIN: SIGNIFICANT CHANGE UP
-  TRIMETHOPRIM/SULFAMETHOXAZOLE: SIGNIFICANT CHANGE UP
CULTURE RESULTS: SIGNIFICANT CHANGE UP
METHOD TYPE: SIGNIFICANT CHANGE UP
ORGANISM # SPEC MICROSCOPIC CNT: SIGNIFICANT CHANGE UP
ORGANISM # SPEC MICROSCOPIC CNT: SIGNIFICANT CHANGE UP
SPECIMEN SOURCE: SIGNIFICANT CHANGE UP

## 2017-10-06 DIAGNOSIS — Z86.19 PERSONAL HISTORY OF OTHER INFECTIOUS AND PARASITIC DISEASES: ICD-10-CM

## 2017-10-06 DIAGNOSIS — I10 ESSENTIAL (PRIMARY) HYPERTENSION: ICD-10-CM

## 2017-10-06 DIAGNOSIS — N30.00 ACUTE CYSTITIS WITHOUT HEMATURIA: ICD-10-CM

## 2017-10-06 DIAGNOSIS — F41.9 ANXIETY DISORDER, UNSPECIFIED: ICD-10-CM

## 2017-10-06 DIAGNOSIS — K59.00 CONSTIPATION, UNSPECIFIED: ICD-10-CM

## 2017-10-06 DIAGNOSIS — F11.23 OPIOID DEPENDENCE WITH WITHDRAWAL: ICD-10-CM

## 2017-10-06 DIAGNOSIS — M54.16 RADICULOPATHY, LUMBAR REGION: ICD-10-CM

## 2017-10-06 DIAGNOSIS — F32.9 MAJOR DEPRESSIVE DISORDER, SINGLE EPISODE, UNSPECIFIED: ICD-10-CM

## 2017-10-06 DIAGNOSIS — K21.9 GASTRO-ESOPHAGEAL REFLUX DISEASE WITHOUT ESOPHAGITIS: ICD-10-CM

## 2017-10-06 DIAGNOSIS — Z97.8 PRESENCE OF OTHER SPECIFIED DEVICES: ICD-10-CM

## 2017-10-06 DIAGNOSIS — F90.9 ATTENTION-DEFICIT HYPERACTIVITY DISORDER, UNSPECIFIED TYPE: ICD-10-CM

## 2017-10-06 DIAGNOSIS — R33.9 RETENTION OF URINE, UNSPECIFIED: ICD-10-CM

## 2017-10-06 DIAGNOSIS — R07.9 CHEST PAIN, UNSPECIFIED: ICD-10-CM

## 2017-10-06 DIAGNOSIS — G89.29 OTHER CHRONIC PAIN: ICD-10-CM

## 2017-10-06 DIAGNOSIS — Z88.8 ALLERGY STATUS TO OTHER DRUGS, MEDICAMENTS AND BIOLOGICAL SUBSTANCES STATUS: ICD-10-CM

## 2017-10-14 ENCOUNTER — EMERGENCY (EMERGENCY)
Facility: HOSPITAL | Age: 57
LOS: 1 days | Discharge: HOME | End: 2017-10-14
Admitting: EMERGENCY MEDICINE
Payer: MEDICARE

## 2017-10-14 VITALS
OXYGEN SATURATION: 98 % | DIASTOLIC BLOOD PRESSURE: 62 MMHG | RESPIRATION RATE: 17 BRPM | TEMPERATURE: 98 F | SYSTOLIC BLOOD PRESSURE: 110 MMHG | HEART RATE: 80 BPM

## 2017-10-14 VITALS
SYSTOLIC BLOOD PRESSURE: 91 MMHG | DIASTOLIC BLOOD PRESSURE: 58 MMHG | WEIGHT: 160.06 LBS | HEIGHT: 66 IN | RESPIRATION RATE: 17 BRPM | OXYGEN SATURATION: 100 % | HEART RATE: 98 BPM | TEMPERATURE: 98 F

## 2017-10-14 DIAGNOSIS — Z98.890 OTHER SPECIFIED POSTPROCEDURAL STATES: Chronic | ICD-10-CM

## 2017-10-14 PROCEDURE — 99283 EMERGENCY DEPT VISIT LOW MDM: CPT

## 2017-10-14 RX ORDER — LINDANE 1 %
1 SHAMPOO TOPICAL ONCE
Qty: 0 | Refills: 0 | Status: COMPLETED | OUTPATIENT
Start: 2017-10-14 | End: 2017-10-14

## 2017-10-14 RX ADMIN — Medication 1 APPLICATION(S): at 17:38

## 2017-10-14 NOTE — ED ADULT NURSE NOTE - CHIEF COMPLAINT QUOTE
MARK from [facility] rehab to r/o scabies. aox3 verbal, nad. hx cva/htn/anxiety/adhd

## 2017-10-14 NOTE — ED ADULT NURSE NOTE - OBJECTIVE STATEMENT
Pt C/O rash and states "it was only on my back initially and now spreading to my body." As per pt, onset of symptom was last November. Pt also C/O pain to entire body reporting "I always have pain." Pt is A&Ox3, VSS, NAD noted.

## 2017-10-14 NOTE — ED PROVIDER NOTE - OBJECTIVE STATEMENT
The pt is a 58 y/o F, who presents to ED requesting to be tx'd for scabies - states rash to back x 1yr and she's convinced it's scabies. + itching. no acute changes in rash or itching. Currently in rehab for opiod abuse. Denies fevers, chills, new rash, pus, pain, has not seen derm over past yr

## 2017-10-14 NOTE — ED PROVIDER NOTE - SKIN, MLM
dry skin, multiple scratch marks w/abrasions to lower back and shins, no burrows, no bites, no pustules or papules, no superimposed inf, no hives of target lesions

## 2017-10-14 NOTE — ED PROVIDER NOTE - MEDICAL DECISION MAKING DETAILS
pt c/o pruritic rash x 1 yr, no acute changes today, wanting to be tx'd for scabies - no objective findings to suggest scabies but will tx. pt in rehab for opiod abuse, pmd made aware and to ensure derm f/u

## 2017-10-18 DIAGNOSIS — Z79.2 LONG TERM (CURRENT) USE OF ANTIBIOTICS: ICD-10-CM

## 2017-10-18 DIAGNOSIS — Z48.00 ENCOUNTER FOR CHANGE OR REMOVAL OF NONSURGICAL WOUND DRESSING: ICD-10-CM

## 2017-10-18 DIAGNOSIS — Z79.899 OTHER LONG TERM (CURRENT) DRUG THERAPY: ICD-10-CM

## 2017-10-18 DIAGNOSIS — R21 RASH AND OTHER NONSPECIFIC SKIN ERUPTION: ICD-10-CM

## 2017-10-18 DIAGNOSIS — Z88.8 ALLERGY STATUS TO OTHER DRUGS, MEDICAMENTS AND BIOLOGICAL SUBSTANCES: ICD-10-CM

## 2017-10-18 DIAGNOSIS — I10 ESSENTIAL (PRIMARY) HYPERTENSION: ICD-10-CM

## 2018-12-11 PROBLEM — G89.29 OTHER CHRONIC PAIN: Chronic | Status: ACTIVE | Noted: 2017-09-28

## 2018-12-11 PROBLEM — I10 ESSENTIAL (PRIMARY) HYPERTENSION: Chronic | Status: ACTIVE | Noted: 2017-09-28

## 2018-12-18 ENCOUNTER — APPOINTMENT (OUTPATIENT)
Dept: SPINE | Facility: CLINIC | Age: 58
End: 2018-12-18
Payer: MEDICARE

## 2018-12-18 VITALS
RESPIRATION RATE: 16 BRPM | OXYGEN SATURATION: 95 % | DIASTOLIC BLOOD PRESSURE: 74 MMHG | BODY MASS INDEX: 27.32 KG/M2 | WEIGHT: 170 LBS | HEART RATE: 89 BPM | HEIGHT: 66 IN | TEMPERATURE: 98.6 F | SYSTOLIC BLOOD PRESSURE: 114 MMHG

## 2018-12-18 DIAGNOSIS — Z87.09 PERSONAL HISTORY OF OTHER DISEASES OF THE RESPIRATORY SYSTEM: ICD-10-CM

## 2018-12-18 DIAGNOSIS — Z80.9 FAMILY HISTORY OF MALIGNANT NEOPLASM, UNSPECIFIED: ICD-10-CM

## 2018-12-18 DIAGNOSIS — R52 PAIN, UNSPECIFIED: ICD-10-CM

## 2018-12-18 DIAGNOSIS — T85.610A BREAKDOWN (MECHANICAL) OF CRANIAL OR SPINAL INFUSION CATHETER, INITIAL ENCOUNTER: ICD-10-CM

## 2018-12-18 DIAGNOSIS — M21.70 UNEQUAL LIMB LENGTH (ACQUIRED), UNSPECIFIED SITE: ICD-10-CM

## 2018-12-18 DIAGNOSIS — Z86.19 PERSONAL HISTORY OF OTHER INFECTIOUS AND PARASITIC DISEASES: ICD-10-CM

## 2018-12-18 DIAGNOSIS — Z86.59 PERSONAL HISTORY OF OTHER MENTAL AND BEHAVIORAL DISORDERS: ICD-10-CM

## 2018-12-18 DIAGNOSIS — Z87.39 PERSONAL HISTORY OF OTHER DISEASES OF THE MUSCULOSKELETAL SYSTEM AND CONNECTIVE TISSUE: ICD-10-CM

## 2018-12-18 DIAGNOSIS — F90.1 ATTENTION-DEFICIT HYPERACTIVITY DISORDER, PREDOMINANTLY HYPERACTIVE TYPE: ICD-10-CM

## 2018-12-18 PROCEDURE — 99204 OFFICE O/P NEW MOD 45 MIN: CPT

## 2018-12-18 RX ORDER — SULFAMETHOXAZOLE AND TRIMETHOPRIM 800; 160 MG/1; MG/1
800-160 TABLET ORAL
Refills: 0 | Status: DISCONTINUED | COMMUNITY

## 2018-12-18 RX ORDER — CLONAZEPAM 0.5 MG
0.5 TABLET,DISINTEGRATING ORAL
Refills: 0 | Status: DISCONTINUED | COMMUNITY

## 2018-12-18 RX ORDER — DEXTROAMPHETAMINE SULFATE, DEXTROAMPHETAMINE SACCHARATE, AMPHETAMINE SULFATE AND AMPHETAMINE ASPARTATE 5; 5; 5; 5 MG/1; MG/1; MG/1; MG/1
20 CAPSULE, EXTENDED RELEASE ORAL DAILY
Refills: 0 | Status: ACTIVE | COMMUNITY

## 2018-12-18 RX ORDER — CLONAZEPAM 0.5 MG/1
0.5 TABLET ORAL
Refills: 0 | Status: ACTIVE | COMMUNITY

## 2018-12-18 RX ORDER — VALSARTAN AND HYDROCHLOROTHIAZIDE 320; 25 MG/1; MG/1
320-25 TABLET, FILM COATED ORAL
Refills: 0 | Status: DISCONTINUED | COMMUNITY

## 2018-12-18 RX ORDER — OXYCODONE 20 MG/1
20 TABLET ORAL
Refills: 0 | Status: DISCONTINUED | COMMUNITY

## 2018-12-18 RX ORDER — CLONAZEPAM 0.25 MG
0.25 TABLET,DISINTEGRATING ORAL
Refills: 0 | Status: DISCONTINUED | COMMUNITY

## 2018-12-18 RX ORDER — OXYCODONE HYDROCHLORIDE 20 MG/1
20 TABLET, FILM COATED, EXTENDED RELEASE ORAL
Refills: 0 | Status: ACTIVE | COMMUNITY

## 2018-12-18 RX ORDER — DEXTROAMPHETAMINE SACCHARATE, AMPHETAMINE ASPARTATE, DEXTROAMPHETAMINE SULFATE, AND AMPHETAMINE SULFATE 2.5; 2.5; 2.5; 2.5 MG/1; MG/1; MG/1; MG/1
10 TABLET ORAL
Refills: 0 | Status: DISCONTINUED | COMMUNITY

## 2018-12-18 RX ORDER — DEXTROAMPHETAMINE SACCHARATE, AMPHETAMINE ASPARTATE, DEXTROAMPHETAMINE SULFATE, AND AMPHETAMINE SULFATE 5; 5; 5; 5 MG/1; MG/1; MG/1; MG/1
20 TABLET ORAL
Refills: 0 | Status: DISCONTINUED | COMMUNITY

## 2018-12-18 RX ORDER — VALSARTAN AND HYDROCHLOROTHIAZIDE 320; 25 MG/1; MG/1
320-25 TABLET, FILM COATED ORAL
Refills: 0 | Status: ACTIVE | COMMUNITY

## 2018-12-18 RX ORDER — TIZANIDINE HYDROCHLORIDE 4 MG/1
4 TABLET ORAL
Refills: 0 | Status: DISCONTINUED | COMMUNITY

## 2018-12-18 RX ORDER — DEXTROAMPHETAMINE SACCHARATE, AMPHETAMINE ASPARTATE, DEXTROAMPHETAMINE SULFATE, AND AMPHETAMINE SULFATE 2.5; 2.5; 2.5; 2.5 MG/1; MG/1; MG/1; MG/1
10 TABLET ORAL DAILY
Refills: 0 | Status: ACTIVE | COMMUNITY

## 2018-12-18 RX ORDER — OXYCODONE 10 MG/1
10 TABLET ORAL
Refills: 0 | Status: ACTIVE | COMMUNITY

## 2018-12-18 RX ORDER — TORSEMIDE 5 MG/1
TABLET ORAL
Refills: 0 | Status: ACTIVE | COMMUNITY

## 2018-12-18 RX ORDER — HYDROXYZINE PAMOATE 50 MG/1
50 CAPSULE ORAL
Refills: 0 | Status: DISCONTINUED | COMMUNITY

## 2018-12-18 RX ORDER — OXYCODONE 10 MG/1
10 TABLET ORAL
Refills: 0 | Status: DISCONTINUED | COMMUNITY

## 2018-12-18 RX ORDER — TIZANIDINE HYDROCHLORIDE 4 MG/1
4 CAPSULE ORAL
Refills: 0 | Status: ACTIVE | COMMUNITY

## 2018-12-18 RX ORDER — HYDROXYZINE PAMOATE 50 MG/1
50 CAPSULE ORAL
Refills: 0 | Status: ACTIVE | COMMUNITY

## 2019-05-31 ENCOUNTER — EMERGENCY (EMERGENCY)
Facility: HOSPITAL | Age: 59
LOS: 1 days | Discharge: HOME | End: 2019-05-31
Attending: EMERGENCY MEDICINE | Admitting: EMERGENCY MEDICINE
Payer: MEDICARE

## 2019-05-31 VITALS
RESPIRATION RATE: 16 BRPM | HEART RATE: 88 BPM | TEMPERATURE: 98 F | OXYGEN SATURATION: 99 % | SYSTOLIC BLOOD PRESSURE: 138 MMHG | DIASTOLIC BLOOD PRESSURE: 80 MMHG

## 2019-05-31 VITALS
HEART RATE: 90 BPM | HEIGHT: 66 IN | DIASTOLIC BLOOD PRESSURE: 88 MMHG | WEIGHT: 160.06 LBS | SYSTOLIC BLOOD PRESSURE: 146 MMHG | OXYGEN SATURATION: 98 % | TEMPERATURE: 98 F | RESPIRATION RATE: 18 BRPM

## 2019-05-31 DIAGNOSIS — Z98.890 OTHER SPECIFIED POSTPROCEDURAL STATES: Chronic | ICD-10-CM

## 2019-05-31 PROCEDURE — 99284 EMERGENCY DEPT VISIT MOD MDM: CPT

## 2019-05-31 PROCEDURE — 36415 COLL VENOUS BLD VENIPUNCTURE: CPT

## 2019-05-31 PROCEDURE — 99283 EMERGENCY DEPT VISIT LOW MDM: CPT

## 2019-05-31 PROCEDURE — 80053 COMPREHEN METABOLIC PANEL: CPT

## 2019-05-31 PROCEDURE — 85025 COMPLETE CBC W/AUTO DIFF WBC: CPT

## 2019-05-31 NOTE — ED ADULT NURSE NOTE - OBJECTIVE STATEMENT
Pt BIBA from assisted living [place] weakness x1 week with associated RUQ Abd Pain.  Pt states "I think its my gallbladder but I don't know and I also have active shingles on my back."  Contact precautions initiated for shingles.  Shingles noted on back, covered by gauze by pt.  EKG in progress.  Pt denies N/V/D, SOB, Fevers, chills, chest pain, /GI symptoms and Dizziness.  PT states she is always in pain and came to ED concerned about opioid med withdrawal, pt states her facility "ran out of my medication", Last taken 24 hours ago.  Pt speaking in complete, clear sentences, no SOB or diff breathing found at this time. Alert and oriented x3.

## 2019-05-31 NOTE — ED ADULT NURSE NOTE - CHPI ED NUR SYMPTOMS NEG
no hematuria/no chills/no blood in stool/no burning urination/no diarrhea/no nausea/no dysuria/no vomiting/no fever/no abdominal distension

## 2019-05-31 NOTE — ED PROVIDER NOTE - CLINICAL SUMMARY MEDICAL DECISION MAKING FREE TEXT BOX
exacerbation of chronic pain without evidence of active infection or acute intraabdominal process.  labs with normal wbc, normal lft.  no pain in ruq with palpation to suggest cholecystitis.  discussed chronic pain management with patient.  she initially stated that Dr. Marcelino told her to come to be admitted.  Dr. Marcelino contacted and case discussed- see progress note.  After confronted with conflicting story, she said maybe it was her pain management doctor who told her to come.  Discussed that she needs to follow up with outpatient treatment plan of her chronic pain and that additional narcotics were not going to be prescribed from the ED.  She said she would go somewhere else.

## 2019-05-31 NOTE — ED PROVIDER NOTE - NSFOLLOWUPINSTRUCTIONS_ED_ALL_ED_FT
You must follow up with your pain management doctor for further management of your chronic pain.  It is not safe for you to be prescribed high dose narcotic medications from the emergency room or multiple physicians.  Please see your primary care provider in 24-48 hours.  Return to the ER if symptoms worsen or other concerns.    Pain Management    WHAT YOU NEED TO KNOW:    Pain management includes medicines and therapies to treat pain from a surgery, injury, or illness. This can help increase your appetite, sleep, and energy. It can also improve your mood and your relationships. You and your family will receive information about how to manage your pain at home. The instructions will include what to do if you have side effects as your pain is managed. It will also include how to handle prescription pain medicine safely if it is prescribed. It is important to follow all instructions so your pain is managed effectively. This will help prevent a return to the hospital.    DISCHARGE INSTRUCTIONS:    Return to the emergency department if:     You have severe pain and it is not time to take your pain medicine yet.        Contact your healthcare provider if:     You have moderate pain and it is not time to take your pain medicine yet.      The medicine you are taking makes you sleepier than usual or confused.      You have pain even after you take your pain medicine.      You have a new pain or the pain seems different than before.      You have constipation from prescription pain medicine that is not helped with treatment.      You have questions or concerns about your condition or care.    Medicines: Pain medicine may be given in any of the following ways, depending on the kind of pain you have:    Over-the-counter:  NSAIDs, such as ibuprofen, help decrease swelling, pain, and fever. NSAIDs can cause stomach bleeding or kidney problems in certain people. If you take blood thinner medicine, always ask if NSAIDs are safe for you. Always read the medicine label and follow directions. Do not give these medicines to children under 6 months of age without direction from your child's healthcare provider.      Acetaminophen decreases pain and fever. It is available without a doctor's order. Ask how much to take and how often to take it. Follow directions. Read the labels of all other medicines you are using to see if they also contain acetaminophen, or ask your doctor or pharmacist. Acetaminophen can cause liver damage if not taken correctly. Do not use more than 4 grams (4,000 milligrams) total of acetaminophen in one day.       A pain cream, gel, or patch may be applied to your skin on painful areas.      Prescription:   Several kinds of prescription pain medicines are available. An example is opioid, or narcotic, pain medicine. Ask your healthcare provider how to take your specific prescription pain medicine safely. Also, some prescription pain medicines contain acetaminophen. Do not take other medicines that contain acetaminophen without talking to your healthcare provider. Too much acetaminophen may cause liver damage.      Muscle relaxers help decrease pain and muscle spasms.      Steroids decrease inflammation that causes pain.       Anesthetic medicines may be injected in or around a nerve to block pain signals from the nerves.      Anxiety medicine decreases anxiety. High levels of anxiety make pain harder to manage.       Antidepressants may be used to help decrease or prevent the symptoms of depression or anxiety. They are also used to treat nerve pain.      Anticonvulsants are usually used to control seizures. They may also be used to decrease chronic pain.      Take your medicine as directed. Contact your healthcare provider if you think your medicine is not helping or if you have side effects. Tell him or her if you are allergic to any medicine. Keep a list of the medicines, vitamins, and herbs you take. Include the amounts, and when and why you take them. Bring the list or the pill bottles to follow-up visits. Carry your medicine list with you in case of an emergency.    Prescription pain medicine safety:     Do not suddenly stop taking prescription pain medicine. If you have been taking prescription pain medicine for longer than 2 weeks, a sudden stop may cause dangerous side effects. Ask your healthcare provider for more information before you stop taking your medicine.       Take your medicine as directed. Take only the amount prescribed or recommended by your healthcare provider. Too much medicine may cause breathing problems or other health issues. If you use a pain patch, be sure to remove the old patch before you place a new one.      Do not drink alcohol while you use prescription medicines. Alcohol with prescription medicines can make you sleepy and slow your breathing rate. You may stop breathing completely.       Do not drive or operate heavy machinery after you take prescription pain medicine. Prescription pain medicine can make you drowsy and make it hard to concentrate. You may injure yourself or others if you drive or operate heavy machinery while taking your medicine.       Time your medicine correctly. Take your pain medicine 30 minutes before exercise or physical therapy. This helps decrease pain to help meet your treatment goals. You may need to take medicine before you go to bed. This may help you sleep and not be woken by pain.       Watch for side effects. Some foods, alcohol, and other medicines may cause side effects when you take pain medicine. Ask your healthcare provider how to prevent these problems.      Prevent constipation. This is a common side effect of prescription pain medicine. Eat foods high in fiber, such as raw fruit, vegetables, beans, and whole-grain bread and cereal. Ask your healthcare provider how much liquid to drink each day and which liquids are best for you. Exercise and activity may also help decrease the risk for constipation.       Follow instructions for what to do with medicine you do not use. Your healthcare provider will give you instructions for how to dispose of pain medicine safely. This helps make sure no one else takes the medicine.    Ways pain may be managed without medicine:     Massage therapy helps relieve tight muscles. This may help you relax and decrease pain.      Ultrasound can help decrease pain. Ultrasound is a procedure that uses sound waves to create heat applied to muscles.      Acupuncture helps reduce pain and other symptoms. Thin needles are used to balance energy channels in the body.      Biofeedback helps your body respond differently to pain. You will learn what your breathing and heart rate are when you are relaxed. That will help you get your breathing and heart rate to those levels when you are in pain.      Electrical stimulation may be used to control pain. Transcutaneous electrical stimulations (TENS) is a portable device that attaches to your skin. It uses mild, safe electrical signals to help control pain. Spinal cord stimulation (SCS) is a procedure that uses a metal wire placed near your spinal cord to help control pain. SCS also uses mild, safe electrical signals. The SCS is placed during surgery.      Surgery and other procedures may help relieve pain. Examples include radio waves, thermal (heat), or laser therapy. Surgery may also include cutting nerves or repairing joints that are the cause of your chronic pain.    What you can do to manage pain: The following may be helpful if you have mild pain or pain between medicine doses:     Apply heat or ice as directed. Heat also relieves muscle spasms. Ice may help prevent tissue damage. Your healthcare provider may recommend only heat or ice, or you may be told to alternate. For heat, use a heat pack, heating pad, or a warm washcloth. The temperature should not be hot enough to burn your skin. Apply heat for 20 to 30 minutes every 2 hours for as many days as directed. For ice, use an ice pack, or put crushed ice in a plastic bag. Cover it with a towel before you place it on your skin. Apply ice for 15 to 20 minutes every hour or as directed.       Elevate the painful area above the level of your heart if possible. This will help decrease swelling and pain. Prop your painful area on pillows or blankets to keep it elevated comfortably.       Apply compression with an elastic bandage or abdominal binder as directed. An elastic bandage may be used after surgery on your joint, such as your knee. An abdominal binder may be used for surgeries in your abdomen.       Use devices to help you move and decrease pain. Devices can help remove pressure from the injury and provide extra support. Assistive devices include a splint, cane, crutches, or a walker. Knee sleeves and braces help decrease pain by giving your knees extra support. Arch supports and orthotics are devices that are put in your shoes to help you stand, walk, or run correctly.       Aromatherapy uses scents to relax, relieve stress, and decrease pain. Oils, extracts, or fragrances from flowers, herbs, and trees may be used. They may be inhaled or used during massages, facials, body wraps, and baths.      Meditation teaches you how to focus inside yourself. The goal of meditation is to help you feel more calm and peaceful.      Guided imagery teaches you to imagine a picture in your mind. You learn to focus on the picture instead of your pain. It may help you learn how to change the way your body senses and responds to pain.      Music may help increase energy levels and improve your mood. It may help reduce pain by triggering your body to release endorphins. These are natural body chemicals that decrease pain. Music may be used with any of the other techniques, such as relaxation and distraction.      Self-hypnosis is a way to direct your attention to something other than your pain. For example, you might repeat a positive statement about ignoring the pain or seeing the pain in a positive way.     What else you can do to manage pain:     Keep a pain diary. A pain diary may help track pain cycles so you know when and how your pain starts and ends. Include anything that makes your pain worse or better. Bring the pain diary to follow-up visits with your healthcare provider.       Talk to your healthcare provider about your daily activities. Some activities can cause pain to become worse or make pain management less effective. Your provider can help you find ways to reduce pain. For example, you may need to change when you take your pain medicine so it is more effective during activities.      Sleep in a comfortable position. Use pillows to support painful areas.      Go to rehabilitation as directed. Rehabilitation may include physical and occupational therapy. A physical therapist teaches you exercises to help improve movement and strength, and to decrease pain. An occupational therapist teaches you skills to help with your daily activities.      Exercise to help relieve pain and increase your energy. Talk to your healthcare provider about how much exercise to get each day and which exercises are best for you.    Follow up with your healthcare provider as directed: Talk to your provider about your pain management at home. Tell him or her if you are able to do more of your daily activities or if any activity still causes pain. Your provider may want to make changes to your pain medicine or refer you to a specialist. For example, an occupational therapist can help you find new ways to do your daily activities so you have less pain. Write down your questions so you remember to ask them during your visits.

## 2019-05-31 NOTE — ED PROVIDER NOTE - PROGRESS NOTE DETAILS
case discussed with Dr. Marcelino on the phone.  States he has filled dilaudid prescription for patient twice (5/9 and 5/24) each time telling her he wouldn't prescribe more narcotics for her chronic pain and that she needed to follow up with her pain management outpatient for further management.  Discussed that patient requesting admission for pain control and he advised against this stating this is chronic outpatient problem.  Labs and stable vs reviewed with him

## 2019-05-31 NOTE — ED PROVIDER NOTE - GASTROINTESTINAL, MLM
Abdomen soft, non-tender, no guarding.  subcutaneous medical device palpable under skin in rlq.  No apparent pain in RUQ with palpation

## 2019-05-31 NOTE — ED PROVIDER NOTE - SKIN, MLM
Skin normal color for race, warm, dry and intact. Right upper back with scabbed over rash on erythematous base with some scant serosanguinous weeping of fluid

## 2019-05-31 NOTE — ED ADULT TRIAGE NOTE - CHIEF COMPLAINT QUOTE
Pt BIBA from assisted living [place] x1 week with associated RUQ Abd Pain.  Pt states "I think its my gallbladder but I don't know and I also have active shingles on my back."  Contact precautions initiated for shingles.  EKG in progress.  Pt denies N/V/D, SOB, Fevers, CP and Dizziness.

## 2019-05-31 NOTE — ED PROVIDER NOTE - OBJECTIVE STATEMENT
here with pain all over her body.  States she has a morphine pump for chronic pain but it isn't working right.  Says she has a gallbladder problem and thinks it might be acting up and also was diagnosed with shingles more than a week ago and taking something that starts with a v. Says she has an appointment with her pain management in 2 weeks and that she talked to Dr. Marcelino today and he told her to come in to "get a tune up" with some medicines until she can see her pain management.  No fever, no chills.  Says she ran out of her dilaudid 1 day ago and is worried she may have a stroke if she doesn't get more.

## 2019-05-31 NOTE — ED ADULT NURSE NOTE - GASTROINTESTINAL WDL
Abdomen soft, nontender, nondistended, bowel sounds present in all 4 quadrants.  Pump insertion site at RLQ, redness noted, no drainage or bleeding at this time.

## 2019-06-04 DIAGNOSIS — M79.18 MYALGIA, OTHER SITE: ICD-10-CM

## 2019-06-04 DIAGNOSIS — G89.29 OTHER CHRONIC PAIN: ICD-10-CM

## 2019-06-04 DIAGNOSIS — Z88.8 ALLERGY STATUS TO OTHER DRUGS, MEDICAMENTS AND BIOLOGICAL SUBSTANCES: ICD-10-CM

## 2019-06-04 DIAGNOSIS — Z79.891 LONG TERM (CURRENT) USE OF OPIATE ANALGESIC: ICD-10-CM

## 2019-06-04 DIAGNOSIS — Z79.899 OTHER LONG TERM (CURRENT) DRUG THERAPY: ICD-10-CM

## 2019-06-04 DIAGNOSIS — Z79.2 LONG TERM (CURRENT) USE OF ANTIBIOTICS: ICD-10-CM

## 2019-06-04 DIAGNOSIS — R53.1 WEAKNESS: ICD-10-CM

## 2019-08-30 ENCOUNTER — TRANSCRIPTION ENCOUNTER (OUTPATIENT)
Age: 59
End: 2019-08-30

## 2019-09-12 ENCOUNTER — APPOINTMENT (OUTPATIENT)
Dept: OBGYN | Facility: CLINIC | Age: 59
End: 2019-09-12
Payer: MEDICARE

## 2019-09-12 DIAGNOSIS — N95.2 POSTMENOPAUSAL ATROPHIC VAGINITIS: ICD-10-CM

## 2019-09-12 PROCEDURE — 87086 URINE CULTURE/COLONY COUNT: CPT

## 2019-09-12 PROCEDURE — 82270 OCCULT BLOOD FECES: CPT

## 2019-09-12 PROCEDURE — G0101: CPT

## 2019-09-12 NOTE — PHYSICAL EXAM
[Awake] : awake [LAD] : lymphadenopathy [Mass] : no breast mass [Nipple Discharge] : no nipple discharge [Tender] : non tender [Soft] : soft [Distended] : not distended [H/Smegaly] : no hepatosplenomegaly [Depressed Mood] : depressed mood [No Lesions] : no genitalia lesions [Oriented x3] : oriented to person, place, and time [Vulvar Atrophy] : vulvar atrophy [Labia Majora] : labia major [Normal] : uterus [Atrophy] : atrophy [Dry Mucosa] : dry mucosa [No Bleeding] : there was no active vaginal bleeding [IUD String] : had an IUD string protruding out [Normal Position] : in a normal position [Tenderness] : nontender [Nl Sphincter Tone] : normal sphincter tone [Occult Blood] : occult blood test from digital rectal exam was negative [Internal Hemorrhoid] : an internal hemorrhoid [External Hemorrhoid] : an external hemorrhoid

## 2019-09-17 LAB
CANDIDA VAG CYTO: NOT DETECTED
G VAGINALIS+PREV SP MTYP VAG QL MICRO: NOT DETECTED
T VAGINALIS VAG QL WET PREP: NOT DETECTED

## 2020-09-14 NOTE — PROGRESS NOTE ADULT - PROBLEM SELECTOR PROBLEM 3
Uneventful night.  Pt given increased dose of levimir last night.  Blood sugar this am down to 174.    Potassium at 3.6 on last lab draw, which doesn't call for replacement.   Next check on chemistries will be 8 am.  Denies pain, nausea, vomiting.  Bowel movement overnight.  Pt could use a stool softener.  
Anxiety
HTN (hypertension)
Pain management
Anxiety
Opioid withdrawal

## 2021-05-26 DIAGNOSIS — Z00.00 ENCOUNTER FOR GENERAL ADULT MEDICAL EXAMINATION W/OUT ABNORMAL FINDINGS: ICD-10-CM

## 2021-06-17 NOTE — ED PROVIDER NOTE - CARE PLAN
1. A physical therapy order was provided for you today.  You can schedule physical therapy before you leave today or will be contacted by physical therapy.  If nobody contacts you within 3 to 5 days, please contact the clinic at [phone].  It will be very important for you to do your physical therapy exercises on a regular basis to decrease your pain and prevent future pain flares.    2. baclofen (muscle relaxant medication) has been prescribed today. Please take 12-1 tab three times daily as needed. This medication may cause drowsiness. Please do not work or drive while taking this medication until you know how it effects you. If it does make you drowsy, you should only take it before bedtime or at times that you do not have to work/drive.    3. An MRI was ordered for you today.  You will be contacted by scheduling within 3 days.    If you are not contacted, please call Radiology at [phone].       Principal Discharge DX:	Pain management

## 2021-08-09 NOTE — ED ADULT TRIAGE NOTE - NS ED NURSE BANDS TYPE
Name band; You can access the FollowMyHealth Patient Portal offered by [facility] by registering at the following website: [email or web address]. By joining [facility]’s FollowMyHealth portal, you will also be able to view your health information using other applications (apps) compatible with our system.

## 2021-08-10 NOTE — PATIENT PROFILE ADULT. - TEACHING/LEARNING LEARNING PREFERENCES
verbal instruction/written material Home with assist as needed from son upon return home. Outpatient PT services to address gait training with appropriate AD, balance training for fall prevention, general strengthening and endurance training all to improve ease with functional mobility and promote independent with ADLs./outpatient PT

## 2021-10-11 ENCOUNTER — RESULT REVIEW (OUTPATIENT)
Age: 61
End: 2021-10-11

## 2021-10-11 ENCOUNTER — APPOINTMENT (OUTPATIENT)
Dept: OBGYN | Facility: CLINIC | Age: 61
End: 2021-10-11
Payer: MEDICARE

## 2021-10-11 VITALS — DIASTOLIC BLOOD PRESSURE: 90 MMHG | SYSTOLIC BLOOD PRESSURE: 140 MMHG

## 2021-10-11 DIAGNOSIS — N64.4 MASTODYNIA: ICD-10-CM

## 2021-10-11 DIAGNOSIS — Z01.419 ENCOUNTER FOR GYNECOLOGICAL EXAMINATION (GENERAL) (ROUTINE) W/OUT ABNORMAL FINDINGS: ICD-10-CM

## 2021-10-11 DIAGNOSIS — I10 ESSENTIAL (PRIMARY) HYPERTENSION: ICD-10-CM

## 2021-10-11 DIAGNOSIS — K58.9 IRRITABLE BOWEL SYNDROME W/OUT DIARRHEA: ICD-10-CM

## 2021-10-11 PROCEDURE — G0101: CPT

## 2021-10-11 PROCEDURE — 99214 OFFICE O/P EST MOD 30 MIN: CPT | Mod: 25

## 2021-10-11 RX ORDER — VALSARTAN AND HYDROCHLOROTHIAZIDE 160; 12.5 MG/1; MG/1
160-12.5 TABLET, FILM COATED ORAL
Refills: 0 | Status: ACTIVE | COMMUNITY
Start: 2021-10-11

## 2021-10-11 NOTE — REVIEW OF SYSTEMS
Statement Selected [Breast Pain] : breast pain [Breast Lump] : breast lump [Negative] : Integumentary [de-identified] : 6:00 left breast lump with discomfort

## 2021-10-11 NOTE — COUNSELING
[Nutrition/ Exercise/ Weight Management] : nutrition, exercise, weight management [Breast Self Exam] : breast self exam [Medication Management] : medication management

## 2021-10-11 NOTE — PHYSICAL EXAM
[Appropriately responsive] : appropriately responsive [Alert] : alert [No Acute Distress] : no acute distress [Mass] : mass [Non-tender] : non-tender [Oriented x3] : oriented x3 [Examination Of The Breasts] : a normal appearance [] : an implant [Breast Reconstruction Left] : breast reconstruction [___cm] : a ~M [unfilled] ~Ucm mass was palpated deep to the nipple [Firm] : firm [Mobile] : mobile [Tender] : tender [6:00] : in the 6:00 position [Labia Majora] : normal [Labia Minora] : normal [Normal] : normal [FreeTextEntry6] : palpable implants with mass at 6:00 on left breast with discomfort when touched as per patient.  [FreeTextEntry8] : without pain

## 2021-10-11 NOTE — HISTORY OF PRESENT ILLNESS
[Patient reported mammogram was normal] : Patient reported mammogram was normal [Patient reported PAP Smear was normal] : Patient reported PAP Smear was normal [postmenopausal] : postmenopausal [N] : Patient is not sexually active [Palpable Lump] : palpable lump [Breast Surgery] : history of breast surgery [No] : Patient does not have concerns regarding sex [Previously active] : previously active [FreeTextEntry1] : Lisa Glynn presents for an annual exam with complaints of a left breast lump. Patient reports that she had a breast augmentation in left breast only. \par This bump was felt last week and the patient is uncomfortable with it/ wants it examined. Ms. Joseph denies having a mammogram within the last 5 years. \par \par Patient currently resides in a nursing home and believes she was not given her BP medication this morning causing her elevated BP in office. \par Patient does not have any other GYN concerns or complaints at this time.  [TextBox_19] : over 5 years ago [PapSmeardate] : 09/19 [TextBox_37] : over 2 years ago [TextBox_43] : 1-2 years ago where polyps were found and patient reports she was supposed to follow up with GI 6 months after screening [TextBox_34] : Patient reports of lump in left breast at 6:00. Augmentation when patient was 18 years old

## 2021-10-11 NOTE — REVIEW OF SYSTEMS
[Breast Pain] : breast pain [Breast Lump] : breast lump [Negative] : Integumentary [de-identified] : 6:00 left breast lump with discomfort

## 2021-10-11 NOTE — PLAN
[FreeTextEntry1] : Patient instructed to make mammogram appointment with bilateral breast US due to breast mass and no screening done in over 5 years. \par Ms. Glynn was also given referrals for Gastroenterology to follow up since last colonoscopy and referral for DEXA scan. \par \par All questions answered and patient to RTO in 1 year for annual exam or sooner with any GYN concerns.

## 2021-10-18 LAB — CYTOLOGY CVX/VAG DOC THIN PREP: ABNORMAL

## 2021-11-01 ENCOUNTER — RESULT REVIEW (OUTPATIENT)
Age: 61
End: 2021-11-01

## 2021-11-01 ENCOUNTER — OUTPATIENT (OUTPATIENT)
Dept: OUTPATIENT SERVICES | Facility: HOSPITAL | Age: 61
LOS: 1 days | End: 2021-11-01
Payer: MEDICARE

## 2021-11-01 DIAGNOSIS — Z98.890 OTHER SPECIFIED POSTPROCEDURAL STATES: Chronic | ICD-10-CM

## 2021-11-01 PROCEDURE — 76641 ULTRASOUND BREAST COMPLETE: CPT

## 2021-11-01 PROCEDURE — 77066 DX MAMMO INCL CAD BI: CPT | Mod: 26

## 2021-11-01 PROCEDURE — G0279: CPT | Mod: 26

## 2021-11-01 PROCEDURE — G0279: CPT

## 2021-11-01 PROCEDURE — 76641 ULTRASOUND BREAST COMPLETE: CPT | Mod: 26,50

## 2021-11-01 PROCEDURE — 77066 DX MAMMO INCL CAD BI: CPT

## 2021-11-08 ENCOUNTER — APPOINTMENT (OUTPATIENT)
Dept: ULTRASOUND IMAGING | Facility: HOSPITAL | Age: 61
End: 2021-11-08
Payer: MEDICARE

## 2021-11-08 ENCOUNTER — APPOINTMENT (OUTPATIENT)
Dept: MAMMOGRAPHY | Facility: HOSPITAL | Age: 61
End: 2021-11-08
Payer: MEDICARE

## 2022-05-18 NOTE — PROGRESS NOTE ADULT - ASSESSMENT
Additional History: Referred by Dr. Fitzpatrick.   This is her first skin cancer. Patient is a 58 yo F with PMHx of HTN, depression, anxiety, chronic pain with multiple past surgeries and intrathecal pump who presents from [facility] due to empty thecal pump for unknown number of days, monitoring for opioid withdrawal and optimization of current pain management regimen.

## 2023-02-10 NOTE — H&P ADULT - PROBLEM/PLAN-2
Detail Level: Zone Quality 110: Preventive Care And Screening: Influenza Immunization: Influenza Immunization Administered during Influenza season DISPLAY PLAN FREE TEXT

## 2023-07-12 NOTE — ED ADULT NURSE NOTE - NS ED NOTE ABUSE SUSPICION NEGLECT YN
[Please see my note below.] : Please see my note below. [Consult Closing:] : Thank you very much for allowing me to participate in the care of this patient.  If you have any questions, please do not hesitate to contact me. [Sincerely,] : Sincerely, [FreeTextEntry3] : Lionel Sigala MD No

## 2024-10-01 NOTE — ED ADULT NURSE NOTE - CARDIO ASSESSMENT
WDL [FreeTextEntry1] : est care and awv [de-identified] : 40 yo F presenting for AWV and establish care She has been dealing with very significant anxiety causing decrased appetite and insomnia. She has unintentional weight loss but states it is because of the anxiety she is admitting nightly panic attacks and disrupted sleep patient does not have a lot of support in raising her two children aged 1 and 3 she has a strained relationship with her parents patient is employed, works from home she has had a few vascular procedures pap UTD need imm records